# Patient Record
Sex: MALE | Race: BLACK OR AFRICAN AMERICAN | HISPANIC OR LATINO | Employment: OTHER | URBAN - METROPOLITAN AREA
[De-identification: names, ages, dates, MRNs, and addresses within clinical notes are randomized per-mention and may not be internally consistent; named-entity substitution may affect disease eponyms.]

---

## 2021-03-20 ENCOUNTER — OFFICE VISIT (OUTPATIENT)
Dept: URGENT CARE | Facility: CLINIC | Age: 56
End: 2021-03-20
Payer: MEDICAID

## 2021-03-20 VITALS
RESPIRATION RATE: 16 BRPM | HEIGHT: 73 IN | OXYGEN SATURATION: 98 % | SYSTOLIC BLOOD PRESSURE: 102 MMHG | BODY MASS INDEX: 23.59 KG/M2 | HEART RATE: 70 BPM | TEMPERATURE: 98.3 F | DIASTOLIC BLOOD PRESSURE: 64 MMHG | WEIGHT: 178 LBS

## 2021-03-20 DIAGNOSIS — M25.552 LEFT HIP PAIN: ICD-10-CM

## 2021-03-20 DIAGNOSIS — Z76.0 MEDICATION REFILL: ICD-10-CM

## 2021-03-20 DIAGNOSIS — F20.9 SCHIZOPHRENIA, UNSPECIFIED TYPE (HCC): ICD-10-CM

## 2021-03-20 PROCEDURE — 99204 OFFICE O/P NEW MOD 45 MIN: CPT | Mod: 25 | Performed by: NURSE PRACTITIONER

## 2021-03-20 RX ORDER — IBUPROFEN 800 MG/1
800 TABLET ORAL EVERY 8 HOURS PRN
Qty: 30 TABLET | Refills: 0 | Status: SHIPPED | OUTPATIENT
Start: 2021-03-20 | End: 2021-04-27 | Stop reason: SDUPTHER

## 2021-03-20 RX ORDER — LURASIDONE HYDROCHLORIDE 20 MG/1
20 TABLET, FILM COATED ORAL
Qty: 60 TABLET | Refills: 0 | Status: SHIPPED | OUTPATIENT
Start: 2021-03-20 | End: 2021-04-27 | Stop reason: SDUPTHER

## 2021-03-20 RX ORDER — KETOROLAC TROMETHAMINE 30 MG/ML
15 INJECTION, SOLUTION INTRAMUSCULAR; INTRAVENOUS ONCE
Status: COMPLETED | OUTPATIENT
Start: 2021-03-20 | End: 2021-03-20

## 2021-03-20 RX ADMIN — KETOROLAC TROMETHAMINE 15 MG: 30 INJECTION, SOLUTION INTRAMUSCULAR; INTRAVENOUS at 15:52

## 2021-03-20 ASSESSMENT — ENCOUNTER SYMPTOMS
DEPRESSION: 0
EYE PAIN: 0
NERVOUS/ANXIOUS: 0
DIZZINESS: 0
NAUSEA: 0
CHILLS: 0
SORE THROAT: 0
HALLUCINATIONS: 0
SHORTNESS OF BREATH: 0
FEVER: 0
VOMITING: 0
MYALGIAS: 0

## 2021-03-20 ASSESSMENT — LIFESTYLE VARIABLES: SUBSTANCE_ABUSE: 0

## 2021-03-20 NOTE — PROGRESS NOTES
Subjective:   David Petty is a 55 y.o. male who presents for Medication Refill (Latuda 20 mg. He needs a referral to PCP. )      HPI  Patient is a 55-year-old male who presents to the urgent care for request of a medication refill of Latuda that he takes daily for his schizophrenia.  Patient states that he has been taking this prescribed a dose of medication for the 3 or more years.  Patient states that this medication is very effective for his symptoms.  He has not had any thoughts of suicidal ideation or hallucinations while taking this medication.  Patient recently moved from [place] and has not yet established care with a new PCP and/or psychiatrist.  Patient has been out of his medication for the past 3 days.  Having no complications at this time.  Patient also notes chronic left hip pain which usually takes 800 mg ibuprofen however has been out of this medication as well.  Patient has been told that he may need a total hip replacement however has not decided to go through with it.  He denies any recent trauma or injuries.  He is also received Toradol injections in the past which has been effective for his pain.  Review of Systems   Constitutional: Negative for chills and fever.   HENT: Negative for sore throat.    Eyes: Negative for pain.   Respiratory: Negative for shortness of breath.    Cardiovascular: Negative for chest pain.   Gastrointestinal: Negative for nausea and vomiting.   Genitourinary: Negative for hematuria.   Musculoskeletal: Positive for joint pain. Negative for myalgias.   Skin: Negative for rash.   Neurological: Negative for dizziness.   Psychiatric/Behavioral: Negative for depression, hallucinations, substance abuse and suicidal ideas. The patient is not nervous/anxious.        Medications:    • ibuprofen Tabs  • ketorolac  • lurasidone Tabs    Allergies: Patient has no known allergies.    Problem List: David Petty does not have a problem list on file.    Surgical History:  No past  "surgical history on file.    Past Social Hx: David Petty  reports that he has been smoking cigarettes. He has been smoking about 5.00 packs per day. He has never used smokeless tobacco. He reports current alcohol use. He reports that he does not use drugs.     Past Family Hx:  David Petty family history is not on file.     Problem list, medications, and allergies reviewed by myself today in Epic.     Objective:     /64   Pulse 70   Temp 36.8 °C (98.3 °F) (Temporal)   Resp 16   Ht 1.854 m (6' 1\")   Wt 80.7 kg (178 lb)   SpO2 98%   BMI 23.48 kg/m²     Physical Exam  Vitals and nursing note reviewed.   Constitutional:       General: He is not in acute distress.     Appearance: He is well-developed.   HENT:      Head: Normocephalic and atraumatic.      Right Ear: External ear normal.      Left Ear: External ear normal.      Nose: Nose normal.      Mouth/Throat:      Mouth: Mucous membranes are moist.   Eyes:      Conjunctiva/sclera: Conjunctivae normal.   Cardiovascular:      Rate and Rhythm: Normal rate.   Pulmonary:      Effort: Pulmonary effort is normal. No respiratory distress.      Breath sounds: Normal breath sounds.   Abdominal:      General: There is no distension.   Musculoskeletal:         General: Normal range of motion.      Left hip: Tenderness present. No deformity or bony tenderness. Normal range of motion. Normal strength.   Skin:     General: Skin is warm and dry.   Neurological:      General: No focal deficit present.      Mental Status: He is alert and oriented to person, place, and time. Mental status is at baseline.      Gait: Gait (gait at baseline) normal.   Psychiatric:         Attention and Perception: Attention normal.         Mood and Affect: Mood normal.         Speech: Speech normal.         Behavior: Behavior normal.         Thought Content: Thought content normal.         Cognition and Memory: Cognition and memory normal.         Judgment: Judgment normal. "         Assessment/Plan:     Diagnosis and associated orders:     1. Medication refill  lurasidone (LATUDA) 20 MG Tab   2. Schizophrenia, unspecified type (HCC)  lurasidone (LATUDA) 20 MG Tab    REFERRAL TO BEHAVIORAL HEALTH    REFERRAL TO FAMILY PRACTICE   3. Left hip pain  ketorolac (TORADOL) injection 15 mg    ibuprofen (MOTRIN) 800 MG Tab      Comments/MDM:     • Patient is a pleasant 55-year-old male who present with the stated above overall physical exam fairly benign, vital signs stable, advised patient will refill medication for a month did advise patient that he will need to establish care with a primary care provider and a new psychiatrist for continued medication management.  Patient will be given a Toradol injection in clinic as this is ineffective for his left hip pain in the past.  • Differential diagnosis, natural history, supportive care, and indications for immediate follow-up discussed.          Please note that this dictation was created using voice recognition software. I have made a reasonable attempt to correct obvious errors, but I expect that there are errors of grammar and possibly content that I did not discover before finalizing the note.    This note was electronically signed by Timothy WEEKS.

## 2021-03-22 ENCOUNTER — TELEPHONE (OUTPATIENT)
Dept: SCHEDULING | Facility: IMAGING CENTER | Age: 56
End: 2021-03-22

## 2021-03-30 ENCOUNTER — IMMUNIZATION (OUTPATIENT)
Dept: FAMILY PLANNING/WOMEN'S HEALTH CLINIC | Facility: IMMUNIZATION CENTER | Age: 56
End: 2021-03-30
Payer: MEDICAID

## 2021-03-30 DIAGNOSIS — Z23 ENCOUNTER FOR VACCINATION: Primary | ICD-10-CM

## 2021-03-30 PROCEDURE — 0001A PFIZER SARS-COV-2 VACCINE: CPT

## 2021-03-30 PROCEDURE — 91300 PFIZER SARS-COV-2 VACCINE: CPT

## 2021-04-14 ENCOUNTER — PATIENT OUTREACH (OUTPATIENT)
Dept: SCHEDULING | Facility: IMAGING CENTER | Age: 56
End: 2021-04-14

## 2021-04-14 NOTE — PROGRESS NOTES
Attempt #  Outreach for COVID Vaccine Second Dose  Outreach Outcome:  4/14/21 Left v/m to schedule 2nd dose. MAURISIOB

## 2021-04-27 ENCOUNTER — OFFICE VISIT (OUTPATIENT)
Dept: MEDICAL GROUP | Facility: MEDICAL CENTER | Age: 56
End: 2021-04-27
Attending: PHYSICIAN ASSISTANT
Payer: MEDICAID

## 2021-04-27 VITALS
HEART RATE: 63 BPM | BODY MASS INDEX: 23.58 KG/M2 | OXYGEN SATURATION: 97 % | TEMPERATURE: 97.3 F | RESPIRATION RATE: 18 BRPM | DIASTOLIC BLOOD PRESSURE: 80 MMHG | HEIGHT: 73 IN | SYSTOLIC BLOOD PRESSURE: 100 MMHG | WEIGHT: 177.9 LBS

## 2021-04-27 DIAGNOSIS — G89.29 CHRONIC LEFT HIP PAIN: ICD-10-CM

## 2021-04-27 DIAGNOSIS — M16.12 OSTEOARTHRITIS OF LEFT HIP, UNSPECIFIED OSTEOARTHRITIS TYPE: ICD-10-CM

## 2021-04-27 DIAGNOSIS — M25.552 CHRONIC LEFT HIP PAIN: ICD-10-CM

## 2021-04-27 DIAGNOSIS — F20.9 SCHIZOPHRENIA, UNSPECIFIED TYPE (HCC): ICD-10-CM

## 2021-04-27 DIAGNOSIS — Z11.59 NEED FOR HEPATITIS C SCREENING TEST: ICD-10-CM

## 2021-04-27 DIAGNOSIS — Z00.00 HEALTH CARE MAINTENANCE: ICD-10-CM

## 2021-04-27 PROCEDURE — 99204 OFFICE O/P NEW MOD 45 MIN: CPT | Performed by: PHYSICIAN ASSISTANT

## 2021-04-27 PROCEDURE — 700111 HCHG RX REV CODE 636 W/ 250 OVERRIDE (IP): Performed by: PHYSICIAN ASSISTANT

## 2021-04-27 PROCEDURE — 99213 OFFICE O/P EST LOW 20 MIN: CPT | Performed by: PHYSICIAN ASSISTANT

## 2021-04-27 RX ORDER — KETOROLAC TROMETHAMINE 30 MG/ML
60 INJECTION, SOLUTION INTRAMUSCULAR; INTRAVENOUS ONCE
Status: DISCONTINUED | OUTPATIENT
Start: 2021-04-27 | End: 2021-04-27

## 2021-04-27 RX ORDER — IBUPROFEN 800 MG/1
800 TABLET ORAL EVERY 8 HOURS PRN
Qty: 30 TABLET | Refills: 0 | Status: SHIPPED | OUTPATIENT
Start: 2021-04-27 | End: 2021-06-02

## 2021-04-27 RX ORDER — CYCLOBENZAPRINE HCL 10 MG
10 TABLET ORAL 3 TIMES DAILY PRN
Qty: 90 TABLET | Refills: 5 | Status: SHIPPED | OUTPATIENT
Start: 2021-04-27 | End: 2022-05-05

## 2021-04-27 RX ORDER — KETOROLAC TROMETHAMINE 30 MG/ML
INJECTION, SOLUTION INTRAMUSCULAR; INTRAVENOUS
COMMUNITY
Start: 2021-03-20 | End: 2021-04-27

## 2021-04-27 RX ORDER — LURASIDONE HYDROCHLORIDE 20 MG/1
20 TABLET, FILM COATED ORAL
Qty: 60 TABLET | Refills: 0 | Status: SHIPPED | OUTPATIENT
Start: 2021-04-27 | End: 2021-07-08 | Stop reason: SDUPTHER

## 2021-04-27 RX ORDER — KETOROLAC TROMETHAMINE 30 MG/ML
30 INJECTION, SOLUTION INTRAMUSCULAR; INTRAVENOUS ONCE
Status: COMPLETED | OUTPATIENT
Start: 2021-04-27 | End: 2021-04-27

## 2021-04-27 RX ADMIN — KETOROLAC TROMETHAMINE 30 MG: 30 INJECTION, SOLUTION INTRAMUSCULAR at 10:58

## 2021-04-27 NOTE — PROGRESS NOTES
"Chief Complaint   Patient presents with   • Establish Care     meds refills     Subjective:     HPI:   David Petty is a 55 y.o. male here to establish care and to discuss the evaluation and management of:    Chronic left hip pain  Onset/KORI: 5 years ago after having a partial hip replacement. He reports complications post op and increased pain over time. He has not seen an orthopedic specialist for several years.   Location: left anterior hip.   Duration: Constant.   Character: \"Feels like a knife that is stuck in the hip.\"  Associated symptoms: Discoloration of the skin over the hip.   Alleviating factors: Toradol.   Aggravating factors: Movement.   Radiation: None.   Treatments tried: Ibuprofen. Prior PCP was giving him a Toradol shot once monthly which has been very beneficial for him.   Red flags: Rest pain.   No fevers, chills, erythema or warm to the site.     Schizophrenia (HCC)  David presents today to establish care. He reports a history of schizophrenia. He has been on Latuda 20 mg once daily and has tolerated the medication well without any known side effects. He states that since being on Latuda his depression and schizophrenia have been well controlled. He also states that all suicidal thoughts have diminished and he has been much happier. He used to be established with a psychiatrist here in town, but reports that the psychiatrist is no longer working in the [place] area.  He is requesting a medication refill and a new referral for psychiatry.     ROS  See HPI.     No Known Allergies    Current medicines (including changes today)  Current Outpatient Medications   Medication Sig Dispense Refill   • cyclobenzaprine (FLEXERIL) 10 mg Tab Take 1 tablet by mouth 3 times a day as needed for Muscle Spasms. 90 tablet 5   • ibuprofen (MOTRIN) 800 MG Tab Take 1 tablet by mouth every 8 hours as needed for Moderate Pain or Inflammation. 30 tablet 0   • lurasidone (LATUDA) 20 MG Tab Take 1 tablet by mouth with dinner. 60 " "tablet 0     Current Facility-Administered Medications   Medication Dose Route Frequency Provider Last Rate Last Admin   • ketorolac (TORADOL) injection 30 mg  30 mg Intramuscular Once Tawana Kiran P.A.-C.         He  has a past medical history of Anxiety, Arthralgia of left hip, Depression, and Schizophrenia (HCC).  He  has a past surgical history that includes appendectomy and arthroplasty.  Social History     Tobacco Use   • Smoking status: Current Every Day Smoker     Packs/day: 1.00     Types: Cigarettes   • Smokeless tobacco: Never Used   Substance Use Topics   • Alcohol use: Yes     Comment: Occasionally   • Drug use: Never       Family History   Problem Relation Age of Onset   • Cancer Neg Hx    • Lung Disease Neg Hx    • Heart Disease Neg Hx    • Diabetes Neg Hx    • Hypertension Neg Hx    • Hyperlipidemia Neg Hx      No family status information on file.       Patient Active Problem List    Diagnosis Date Noted   • Chronic left hip pain 04/27/2021   • Schizophrenia (HCC) 04/27/2021        Objective:     /80 (BP Location: Left arm, Patient Position: Sitting, BP Cuff Size: Adult long)   Pulse 63   Temp 36.3 °C (97.3 °F) (Temporal)   Resp 18   Ht 1.854 m (6' 1\")   Wt 80.7 kg (177 lb 14.4 oz)   SpO2 97%  Body mass index is 23.47 kg/m².    Physical Exam:  Physical Exam   Constitutional: He is oriented to person, place, and time and well-developed, well-nourished, and in no distress.   HENT:   Head: Normocephalic.   Right Ear: External ear normal.   Left Ear: External ear normal.   Eyes: Pupils are equal, round, and reactive to light.   Neck: No thyromegaly present.   Cardiovascular: Normal rate, regular rhythm and normal heart sounds.   Pulmonary/Chest: Effort normal and breath sounds normal.   Musculoskeletal:      Cervical back: Normal range of motion.      Left hip: Tenderness present. Decreased range of motion. Decreased strength.   Lymphadenopathy:     He has no cervical adenopathy.        " Right: No supraclavicular adenopathy present.        Left: No supraclavicular adenopathy present.   Neurological: He is alert and oriented to person, place, and time. Gait normal.   Skin: Skin is warm and dry.   Psychiatric: Affect and judgment normal.   Vitals reviewed.     Assessment and Plan:     The following treatment plan was discussed:    1. Chronic left hip pain, Osteoarthritis of left hip, unspecified osteoarthritis type  - This is chronic worsening condition.  - Patient is experiencing pain at rest, has decreased ROM and strength.   - Plan: REFERRAL TO ORTHOPEDICS. In the meantime, refills of ibuprofen and flexeril were sent to the pharmacy. We will provide him with a toradol injection today to help with pain.   - cyclobenzaprine (FLEXERIL) 10 mg Tab; Take 1 tablet by mouth 3 times a day as needed for Muscle Spasms.  Dispense: 90 tablet; Refill: 5  - ibuprofen (MOTRIN) 800 MG Tab; Take 1 tablet by mouth every 8 hours as needed for Moderate Pain or Inflammation.  Dispense: 30 tablet; Refill: 0  - ketorolac (TORADOL) injection 30 mg    2. Schizophrenia, unspecified type (HCC)  - This is a chronic well controlled condition.   - No imminent risk of suicidal or homicidal ideation.   - Plan:  REFERRAL TO PSYCHIATRY for medication management. I have agreed to bridge his Latuda prescription until he can get reestablished with psychiatry.  - lurasidone (LATUDA) 20 MG Tab; Take 1 tablet by mouth with dinner.  Dispense: 60 tablet; Refill: 0    3. Health care maintenance  - David is overdue for health maintenance labs.   - CBC WITH DIFFERENTIAL; Future  - Comp Metabolic Panel; Future  - HEMOGLOBIN A1C; Future  - Lipid Profile; Future  - TSH WITH REFLEX TO FT4; Future  - VITAMIN D,25 HYDROXY; Future  - H. PYLORI, UREA BREATH TEST, ADULT; Future  - T-TRANSGLUTAMINASE (TTG) IGA; Future    4. Need for hepatitis C screening test  - HEP C VIRUS ANTIBODY; Future      Any change or worsening of signs or symptoms, patient  encouraged to follow-up or report to emergency room for further evaluation. Patient verbalizes understanding and agrees.    Follow-Up: Return in about 4 weeks (around 5/25/2021) for F/u labs, Med check.      PLEASE NOTE: This dictation was created using voice recognition software. I have made every reasonable attempt to correct obvious errors, but I expect that there are errors of grammar and possibly content that I did not discover before finalizing the note.

## 2021-04-27 NOTE — ASSESSMENT & PLAN NOTE
"Onset/KORI: 5 years ago after having a partial hip replacement. He reports complications post op and increased pain over time. He has not seen an orthopedic specialist for several years.   Location: left anterior hip.   Duration: Constant.   Character: \"Feels like a knife that is stuck in the hip.\"  Associated symptoms: Discoloration of the skin over the hip.   Alleviating factors: Toradol.   Aggravating factors: Movement.   Radiation: None.   Treatments tried: Ibuprofen. Prior PCP was giving him a Toradol shot once monthly which has been very beneficial for him.   Red flags: Rest pain.   No fevers, chills, erythema or warm to the site.   "

## 2021-04-27 NOTE — ASSESSMENT & PLAN NOTE
David presents today to establish care. He reports a history of schizophrenia. He has been on Latuda 20 mg once daily and has tolerated the medication well without any known side effects. He states that since being on Latuda his depression and schizophrenia have been well controlled. He also states that all suicidal thoughts have diminished and he has been much happier. He used to be established with a psychiatrist here in [facility], but reports that the psychiatrist is no longer working in the [place] area.  He is requesting a medication refill and a new referral for psychiatry.

## 2021-04-30 ENCOUNTER — IMMUNIZATION (OUTPATIENT)
Dept: FAMILY PLANNING/WOMEN'S HEALTH CLINIC | Facility: IMMUNIZATION CENTER | Age: 56
End: 2021-04-30
Attending: INTERNAL MEDICINE
Payer: MEDICAID

## 2021-04-30 DIAGNOSIS — Z23 ENCOUNTER FOR VACCINATION: Primary | ICD-10-CM

## 2021-04-30 PROCEDURE — 91300 PFIZER SARS-COV-2 VACCINE: CPT | Performed by: INTERNAL MEDICINE

## 2021-04-30 PROCEDURE — 0002A PFIZER SARS-COV-2 VACCINE: CPT | Performed by: INTERNAL MEDICINE

## 2021-05-10 ENCOUNTER — HOSPITAL ENCOUNTER (OUTPATIENT)
Dept: LAB | Facility: MEDICAL CENTER | Age: 56
End: 2021-05-10
Attending: PHYSICIAN ASSISTANT
Payer: MEDICAID

## 2021-05-10 DIAGNOSIS — Z00.00 HEALTH CARE MAINTENANCE: ICD-10-CM

## 2021-05-10 DIAGNOSIS — Z11.59 NEED FOR HEPATITIS C SCREENING TEST: ICD-10-CM

## 2021-05-10 LAB
ALBUMIN SERPL BCP-MCNC: 4 G/DL (ref 3.2–4.9)
ALBUMIN/GLOB SERPL: 1.2 G/DL
ALP SERPL-CCNC: 50 U/L (ref 30–99)
ALT SERPL-CCNC: 17 U/L (ref 2–50)
ANION GAP SERPL CALC-SCNC: 9 MMOL/L (ref 7–16)
AST SERPL-CCNC: 17 U/L (ref 12–45)
BASOPHILS # BLD AUTO: 0.7 % (ref 0–1.8)
BASOPHILS # BLD: 0.06 K/UL (ref 0–0.12)
BILIRUB SERPL-MCNC: 0.5 MG/DL (ref 0.1–1.5)
BUN SERPL-MCNC: 13 MG/DL (ref 8–22)
CALCIUM SERPL-MCNC: 9.7 MG/DL (ref 8.5–10.5)
CHLORIDE SERPL-SCNC: 104 MMOL/L (ref 96–112)
CHOLEST SERPL-MCNC: 232 MG/DL (ref 100–199)
CO2 SERPL-SCNC: 25 MMOL/L (ref 20–33)
CREAT SERPL-MCNC: 1.05 MG/DL (ref 0.5–1.4)
EOSINOPHIL # BLD AUTO: 0.4 K/UL (ref 0–0.51)
EOSINOPHIL NFR BLD: 4.6 % (ref 0–6.9)
ERYTHROCYTE [DISTWIDTH] IN BLOOD BY AUTOMATED COUNT: 46.4 FL (ref 35.9–50)
EST. AVERAGE GLUCOSE BLD GHB EST-MCNC: 108 MG/DL
FASTING STATUS PATIENT QL REPORTED: NORMAL
GLOBULIN SER CALC-MCNC: 3.3 G/DL (ref 1.9–3.5)
GLUCOSE SERPL-MCNC: 81 MG/DL (ref 65–99)
HBA1C MFR BLD: 5.4 % (ref 4–5.6)
HCT VFR BLD AUTO: 54.7 % (ref 42–52)
HCV AB SER QL: NORMAL
HDLC SERPL-MCNC: 39 MG/DL
HGB BLD-MCNC: 18.5 G/DL (ref 14–18)
IMM GRANULOCYTES # BLD AUTO: 0.03 K/UL (ref 0–0.11)
IMM GRANULOCYTES NFR BLD AUTO: 0.3 % (ref 0–0.9)
LDLC SERPL CALC-MCNC: 164 MG/DL
LYMPHOCYTES # BLD AUTO: 2.88 K/UL (ref 1–4.8)
LYMPHOCYTES NFR BLD: 33.4 % (ref 22–41)
MCH RBC QN AUTO: 33 PG (ref 27–33)
MCHC RBC AUTO-ENTMCNC: 33.8 G/DL (ref 33.7–35.3)
MCV RBC AUTO: 97.7 FL (ref 81.4–97.8)
MONOCYTES # BLD AUTO: 0.71 K/UL (ref 0–0.85)
MONOCYTES NFR BLD AUTO: 8.2 % (ref 0–13.4)
NEUTROPHILS # BLD AUTO: 4.54 K/UL (ref 1.82–7.42)
NEUTROPHILS NFR BLD: 52.8 % (ref 44–72)
NRBC # BLD AUTO: 0 K/UL
NRBC BLD-RTO: 0 /100 WBC
PLATELET # BLD AUTO: 210 K/UL (ref 164–446)
PMV BLD AUTO: 11 FL (ref 9–12.9)
POTASSIUM SERPL-SCNC: 4.7 MMOL/L (ref 3.6–5.5)
PROT SERPL-MCNC: 7.3 G/DL (ref 6–8.2)
RBC # BLD AUTO: 5.6 M/UL (ref 4.7–6.1)
SODIUM SERPL-SCNC: 138 MMOL/L (ref 135–145)
TRIGL SERPL-MCNC: 145 MG/DL (ref 0–149)
TSH SERPL DL<=0.005 MIU/L-ACNC: 1.08 UIU/ML (ref 0.38–5.33)
WBC # BLD AUTO: 8.6 K/UL (ref 4.8–10.8)

## 2021-05-10 PROCEDURE — 83013 H PYLORI (C-13) BREATH: CPT

## 2021-05-10 PROCEDURE — 83036 HEMOGLOBIN GLYCOSYLATED A1C: CPT

## 2021-05-10 PROCEDURE — 36415 COLL VENOUS BLD VENIPUNCTURE: CPT

## 2021-05-10 PROCEDURE — 85025 COMPLETE CBC W/AUTO DIFF WBC: CPT

## 2021-05-10 PROCEDURE — 83516 IMMUNOASSAY NONANTIBODY: CPT

## 2021-05-10 PROCEDURE — 84443 ASSAY THYROID STIM HORMONE: CPT

## 2021-05-10 PROCEDURE — 80061 LIPID PANEL: CPT

## 2021-05-10 PROCEDURE — 86803 HEPATITIS C AB TEST: CPT

## 2021-05-10 PROCEDURE — 80053 COMPREHEN METABOLIC PANEL: CPT

## 2021-05-10 PROCEDURE — 82306 VITAMIN D 25 HYDROXY: CPT

## 2021-05-11 LAB — UREA BREATH TEST QL: NEGATIVE

## 2021-05-12 LAB
25(OH)D3 SERPL-MCNC: 15 NG/ML (ref 30–80)
TTG IGA SER IA-ACNC: <2 U/ML (ref 0–3)

## 2021-06-02 ENCOUNTER — OFFICE VISIT (OUTPATIENT)
Dept: MEDICAL GROUP | Facility: MEDICAL CENTER | Age: 56
End: 2021-06-02
Attending: PHYSICIAN ASSISTANT
Payer: MEDICAID

## 2021-06-02 VITALS
TEMPERATURE: 97.7 F | OXYGEN SATURATION: 66 % | RESPIRATION RATE: 18 BRPM | SYSTOLIC BLOOD PRESSURE: 90 MMHG | HEIGHT: 73 IN | BODY MASS INDEX: 22.97 KG/M2 | WEIGHT: 173.3 LBS | DIASTOLIC BLOOD PRESSURE: 70 MMHG | HEART RATE: 100 BPM

## 2021-06-02 DIAGNOSIS — E78.5 DYSLIPIDEMIA: ICD-10-CM

## 2021-06-02 DIAGNOSIS — G89.29 CHRONIC LEFT HIP PAIN: ICD-10-CM

## 2021-06-02 DIAGNOSIS — Z71.6 ENCOUNTER FOR SMOKING CESSATION COUNSELING: ICD-10-CM

## 2021-06-02 DIAGNOSIS — E55.9 VITAMIN D DEFICIENCY: ICD-10-CM

## 2021-06-02 DIAGNOSIS — M25.552 CHRONIC LEFT HIP PAIN: ICD-10-CM

## 2021-06-02 PROCEDURE — 99213 OFFICE O/P EST LOW 20 MIN: CPT | Performed by: PHYSICIAN ASSISTANT

## 2021-06-02 PROCEDURE — 700111 HCHG RX REV CODE 636 W/ 250 OVERRIDE (IP): Performed by: PHYSICIAN ASSISTANT

## 2021-06-02 PROCEDURE — 99214 OFFICE O/P EST MOD 30 MIN: CPT | Performed by: PHYSICIAN ASSISTANT

## 2021-06-02 RX ORDER — KETOROLAC TROMETHAMINE 30 MG/ML
60 INJECTION, SOLUTION INTRAMUSCULAR; INTRAVENOUS ONCE
Status: COMPLETED | OUTPATIENT
Start: 2021-06-02 | End: 2021-06-02

## 2021-06-02 RX ORDER — CHOLECALCIFEROL (VITAMIN D3) 25 MCG
25 CAPSULE ORAL DAILY
Qty: 60 CAPSULE | Refills: 5 | Status: SHIPPED | OUTPATIENT
Start: 2021-06-02 | End: 2023-11-09 | Stop reason: SDUPTHER

## 2021-06-02 RX ORDER — KETOROLAC TROMETHAMINE 30 MG/ML
30 INJECTION, SOLUTION INTRAMUSCULAR; INTRAVENOUS ONCE
Status: DISCONTINUED | OUTPATIENT
Start: 2021-06-02 | End: 2021-06-02

## 2021-06-02 RX ORDER — VARENICLINE TARTRATE 0.5 MG/1
0.5 TABLET, FILM COATED ORAL DAILY
Qty: 11 TABLET | Refills: 0 | Status: SHIPPED | OUTPATIENT
Start: 2021-06-02 | End: 2021-06-17

## 2021-06-02 RX ORDER — ATORVASTATIN CALCIUM 10 MG/1
10 TABLET, FILM COATED ORAL DAILY
Qty: 30 TABLET | Refills: 5 | Status: SHIPPED | OUTPATIENT
Start: 2021-06-02 | End: 2022-10-12

## 2021-06-02 RX ADMIN — KETOROLAC TROMETHAMINE 60 MG: 30 INJECTION, SOLUTION INTRAMUSCULAR at 15:00

## 2021-06-02 ASSESSMENT — FIBROSIS 4 INDEX: FIB4 SCORE: 1.1

## 2021-06-02 NOTE — PATIENT INSTRUCTIONS
Chantix/varenicline:   Initial:  Days 1 to 3: 0.5 mg once daily  Days 4 to 7: 0.5 mg twice daily  Maintenance (? Day 8): 1 mg twice daily for 11 weeks; may consider a temporary or permanent dose reduction if usual dose is not tolerated.    Note: Start 1 week before target quit date: 6/9/21.  If able to successfully quit smoking at the end of the 12 weeks, may continue for another 12 weeks to help maintain success.

## 2021-06-02 NOTE — PROGRESS NOTES
Chief Complaint   Patient presents with   • Other     discuss labs       Subjective:     HPI:   David Petty is a 56 y.o. male here to discuss the evaluation and management of:    Encounter for smoking cessation counseling  This is a chronic condition. He is smoking tobacco.  Years used: 40 years.  Packs/day: 2-3 packs per day.   Previously quit/prior attempts: Over a half a dozen times.   Duration of success: 30 days -- because he was admitted into the hospital.    Method used: Patches and gum.   Triggers/reason for relapse: Habit and stress.   Denies dyspnea, coughing or wheezing.   Set quit date: 6/16/21.   Quitting partner or good support system: Yes.     Dyslipidemia  This is a new condition.   Clinical ASCVD: None.   10 yr ASCVD risk score: 8.2%  Cardiovascular risk factors: HLD (HDL <40 or LDL >130), Smoking.    Last lipid panel:   Lab Results   Component Value Date/Time    CHOLSTRLTOT 232 (H) 05/10/2021 10:30 AM     (H) 05/10/2021 10:30 AM    HDL 39 (A) 05/10/2021 10:30 AM    TRIGLYCERIDE 145 05/10/2021 10:30 AM       Chronic left hip pain  David presents today for follow-up.  He reports that he continues to have chronic left hip pain.  He has a follow-up appointment scheduled for review of x-rays on 6/9/2021.  He states that his insurance just approved for him to continue in physical therapy.    ROS  See HPI.     No Known Allergies    Current medicines (including changes today)  Current Outpatient Medications   Medication Sig Dispense Refill   • Cholecalciferol (VITAMIN D-3) 25 MCG (1000 UT) Cap Take 25 mcg by mouth every day. 60 capsule 5   • atorvastatin (LIPITOR) 10 MG Tab Take 1 tablet by mouth every day. 30 tablet 5   • varenicline (CHANTIX) 0.5 MG tablet Take 1 tablet by mouth every day. 11 tablet 0   • cyclobenzaprine (FLEXERIL) 10 mg Tab Take 1 tablet by mouth 3 times a day as needed for Muscle Spasms. 90 tablet 5   • lurasidone (LATUDA) 20 MG Tab Take 1 tablet by mouth with dinner. 60 tablet  "0     No current facility-administered medications for this visit.       Social History     Tobacco Use   • Smoking status: Current Every Day Smoker     Packs/day: 1.00     Types: Cigarettes   • Smokeless tobacco: Never Used   Vaping Use   • Vaping Use: Never used   Substance Use Topics   • Alcohol use: Yes     Comment: Occasionally   • Drug use: Never       Patient Active Problem List    Diagnosis Date Noted   • Encounter for smoking cessation counseling 06/02/2021   • Dyslipidemia 06/02/2021   • Chronic left hip pain 04/27/2021   • Schizophrenia (HCC) 04/27/2021       Family History   Problem Relation Age of Onset   • Cancer Neg Hx    • Lung Disease Neg Hx    • Heart Disease Neg Hx    • Diabetes Neg Hx    • Hypertension Neg Hx    • Hyperlipidemia Neg Hx         Objective:     BP (!) 90/70 (BP Location: Left arm, Patient Position: Sitting, BP Cuff Size: Adult)   Pulse 100   Temp 36.5 °C (97.7 °F) (Temporal)   Resp 18   Ht 1.854 m (6' 1\")   Wt 78.6 kg (173 lb 4.8 oz)   SpO2 (!) 66%  Body mass index is 22.86 kg/m².    Physical Exam:  Physical Exam  Vitals reviewed.   Constitutional:       Appearance: Normal appearance.   HENT:      Head: Normocephalic.   Cardiovascular:      Rate and Rhythm: Normal rate and regular rhythm.      Heart sounds: Normal heart sounds.   Pulmonary:      Effort: Pulmonary effort is normal.      Breath sounds: Normal breath sounds.   Musculoskeletal:      Left hip: Tenderness and bony tenderness present. Decreased range of motion. Decreased strength.   Skin:     General: Skin is warm and dry.   Neurological:      General: No focal deficit present.      Mental Status: He is alert and oriented to person, place, and time.   Psychiatric:         Mood and Affect: Mood normal.         Behavior: Behavior normal.       Assessment and Plan:     The following treatment plan was discussed:    1. Encounter for smoking cessation counseling  - This is a chronic condition in which the patient is " seeking treatment to quit smoking.  - Spent the majority of the time during the visit counseling the patient on smoking cessation.  - Varenicline (CHANTIX) 0.5 MG tablet; Take 1 Tab by mouth 2 Times a day, Dispense: 11 Tab; Refill: 0              - Days 1 to 3: 0.5 mg once daily              - Days 4 to 7: 0.5 mg twice daily  - Maintenance (? Day 8): 1 mg twice daily for 11 weeks; may consider a temporary or permanent dose reduction if usual dose is not tolerated.  - Education given on medication use/regimen and side effect profile.  - Follow up in 3 weeks for med check.     2. Dyslipidemia  - This is a new condition.  - Plan: Start on 10 mg of Atorvastatin nightly. Discussed the important of lifestyle changes that include a well balanced diet, exercise and smoking cessation.  - atorvastatin (LIPITOR) 10 MG Tab; Take 1 tablet by mouth every day.  Dispense: 30 tablet; Refill: 5    3. Chronic left hip pain  - This is a chronic condition.  - Plan: Continue follow up with ortho as scheduled. ROR signed in clinic today to obtain records.  - ketorolac (TORADOL) injection of 30 mg was given to the patient today in clinic.     4. Vitamin D deficiency  - Cholecalciferol (VITAMIN D-3) 25 MCG (1000 UT) Cap; Take 25 mcg by mouth every day.  Dispense: 60 capsule; Refill: 5     Any change or worsening of signs or symptoms, patient encouraged to follow-up or report to emergency room for further evaluation. Patient verbalizes understanding and agrees.    Follow-Up: Return in about 4 weeks (around 6/30/2021) for Med check.      PLEASE NOTE: This dictation was created using voice recognition software. I have made every reasonable attempt to correct obvious errors, but I expect that there are errors of grammar and possibly content that I did not discover before finalizing the note.

## 2021-06-02 NOTE — ASSESSMENT & PLAN NOTE
David presents today for follow-up.  He reports that he continues to have chronic left hip pain.  He has a follow-up appointment scheduled for review of x-rays on 6/9/2021.  He states that his insurance just approved for him to continue in physical therapy.

## 2021-06-02 NOTE — ASSESSMENT & PLAN NOTE
This is a new condition.   Clinical ASCVD: None.   10 yr ASCVD risk score: 8.2%  Cardiovascular risk factors: HLD (HDL <40 or LDL >130), Smoking.    Last lipid panel:   Lab Results   Component Value Date/Time    CHOLSTRLTOT 232 (H) 05/10/2021 10:30 AM     (H) 05/10/2021 10:30 AM    HDL 39 (A) 05/10/2021 10:30 AM    TRIGLYCERIDE 145 05/10/2021 10:30 AM

## 2021-06-02 NOTE — ASSESSMENT & PLAN NOTE
This is a chronic condition. He is smoking tobacco.  Years used: 40 years.  Packs/day: 2-3 packs per day.   Previously quit/prior attempts: Over a half a dozen times.   Duration of success: 30 days -- because he was admitted into the hospital.    Method used: Patches and gum.   Triggers/reason for relapse: Habit and stress.   Denies dyspnea, coughing or wheezing.   Set quit date: 6/16/21.   Quitting partner or good support system: Yes.

## 2021-06-17 DIAGNOSIS — Z71.6 ENCOUNTER FOR SMOKING CESSATION COUNSELING: ICD-10-CM

## 2021-06-17 RX ORDER — VARENICLINE TARTRATE 1 MG/1
1 TABLET, FILM COATED ORAL 2 TIMES DAILY
Qty: 60 TABLET | Refills: 3 | Status: SHIPPED | OUTPATIENT
Start: 2021-06-17 | End: 2021-07-08 | Stop reason: SDUPTHER

## 2021-07-08 ENCOUNTER — OFFICE VISIT (OUTPATIENT)
Dept: MEDICAL GROUP | Facility: MEDICAL CENTER | Age: 56
End: 2021-07-08
Attending: PHYSICIAN ASSISTANT
Payer: MEDICAID

## 2021-07-08 VITALS
HEART RATE: 68 BPM | WEIGHT: 176.1 LBS | OXYGEN SATURATION: 95 % | BODY MASS INDEX: 23.34 KG/M2 | TEMPERATURE: 98.1 F | SYSTOLIC BLOOD PRESSURE: 132 MMHG | HEIGHT: 73 IN | RESPIRATION RATE: 18 BRPM | DIASTOLIC BLOOD PRESSURE: 90 MMHG

## 2021-07-08 DIAGNOSIS — F20.9 SCHIZOPHRENIA, UNSPECIFIED TYPE (HCC): ICD-10-CM

## 2021-07-08 DIAGNOSIS — M25.552 CHRONIC LEFT HIP PAIN: ICD-10-CM

## 2021-07-08 DIAGNOSIS — Z71.6 ENCOUNTER FOR SMOKING CESSATION COUNSELING: ICD-10-CM

## 2021-07-08 DIAGNOSIS — G89.29 CHRONIC LEFT HIP PAIN: ICD-10-CM

## 2021-07-08 PROCEDURE — 99213 OFFICE O/P EST LOW 20 MIN: CPT | Performed by: PHYSICIAN ASSISTANT

## 2021-07-08 PROCEDURE — 700111 HCHG RX REV CODE 636 W/ 250 OVERRIDE (IP): Performed by: PHYSICIAN ASSISTANT

## 2021-07-08 PROCEDURE — 99214 OFFICE O/P EST MOD 30 MIN: CPT | Performed by: PHYSICIAN ASSISTANT

## 2021-07-08 RX ORDER — KETOROLAC TROMETHAMINE 30 MG/ML
60 INJECTION, SOLUTION INTRAMUSCULAR; INTRAVENOUS ONCE
Status: COMPLETED | OUTPATIENT
Start: 2021-07-08 | End: 2021-07-08

## 2021-07-08 RX ORDER — VARENICLINE TARTRATE 1 MG/1
1 TABLET, FILM COATED ORAL 2 TIMES DAILY
Qty: 60 TABLET | Refills: 3 | Status: SHIPPED | OUTPATIENT
Start: 2021-07-08 | End: 2022-10-12

## 2021-07-08 RX ORDER — LURASIDONE HYDROCHLORIDE 20 MG/1
20 TABLET, FILM COATED ORAL
Qty: 60 TABLET | Refills: 3 | Status: SHIPPED | OUTPATIENT
Start: 2021-07-08 | End: 2022-01-06 | Stop reason: SDUPTHER

## 2021-07-08 RX ADMIN — KETOROLAC TROMETHAMINE 60 MG: 30 INJECTION, SOLUTION INTRAMUSCULAR at 12:15

## 2021-07-08 ASSESSMENT — FIBROSIS 4 INDEX: FIB4 SCORE: 1.1

## 2021-07-08 NOTE — PROGRESS NOTES
Chief Complaint   Patient presents with   • Other     Medication refill       Subjective:     HPI:   David Petty is a 56 y.o. male here to discuss the evaluation and management of:    Chronic left hip pain  David presents today for a Toradol injection. He reports great pain relief with this.     Encounter for smoking cessation counseling  David presents today for follow up regarding smoking cessation. He states that he has been taking the Chantix as prescribed and reports that he has been tolerating it well without any known side effects. He has cut back tremendously on the amount he is smoking. He is smoking about 3/4 less than what he has typically smoked in the past. He is overall doing well and is requesting a refill of this medication.     ROS  See HPI.     No Known Allergies    Current medicines (including changes today)  Current Outpatient Medications   Medication Sig Dispense Refill   • varenicline (CHANTIX) 1 MG tablet Take 1 tablet by mouth 2 times a day. 60 tablet 3   • lurasidone (LATUDA) 20 MG Tab Take 1 tablet by mouth with dinner. 60 tablet 3   • Cholecalciferol (VITAMIN D-3) 25 MCG (1000 UT) Cap Take 25 mcg by mouth every day. 60 capsule 5   • atorvastatin (LIPITOR) 10 MG Tab Take 1 tablet by mouth every day. 30 tablet 5   • cyclobenzaprine (FLEXERIL) 10 mg Tab Take 1 tablet by mouth 3 times a day as needed for Muscle Spasms. 90 tablet 5     No current facility-administered medications for this visit.       Social History     Tobacco Use   • Smoking status: Current Every Day Smoker     Packs/day: 1.00     Types: Cigarettes   • Smokeless tobacco: Never Used   Vaping Use   • Vaping Use: Never used   Substance Use Topics   • Alcohol use: Yes     Comment: Occasionally   • Drug use: Never       Patient Active Problem List    Diagnosis Date Noted   • Encounter for smoking cessation counseling 06/02/2021   • Dyslipidemia 06/02/2021   • Chronic left hip pain 04/27/2021   • Schizophrenia (HCC) 04/27/2021  "      Family History   Problem Relation Age of Onset   • Cancer Neg Hx    • Lung Disease Neg Hx    • Heart Disease Neg Hx    • Diabetes Neg Hx    • Hypertension Neg Hx    • Hyperlipidemia Neg Hx         Objective:     /90 (BP Location: Left arm, Patient Position: Sitting, BP Cuff Size: Adult)   Pulse 68   Temp 36.7 °C (98.1 °F) (Temporal)   Resp 18   Ht 1.854 m (6' 1\")   Wt 79.9 kg (176 lb 1.6 oz)   SpO2 95%  Body mass index is 23.23 kg/m².    Physical Exam:  Physical Exam  Vitals reviewed.   Constitutional:       Appearance: Normal appearance.   HENT:      Head: Normocephalic and atraumatic.   Cardiovascular:      Rate and Rhythm: Regular rhythm.      Pulses: Normal pulses.      Heart sounds: Normal heart sounds.   Pulmonary:      Effort: Pulmonary effort is normal.      Breath sounds: Normal breath sounds.   Musculoskeletal:      Cervical back: Normal range of motion.      Left hip: Bony tenderness present. Decreased range of motion. Decreased strength.   Neurological:      General: No focal deficit present.      Mental Status: He is alert and oriented to person, place, and time.   Psychiatric:         Mood and Affect: Mood normal.         Behavior: Behavior normal.       Assessment and Plan:     The following treatment plan was discussed:    1. Chronic left hip pain  - This is a chronic condition.   - Plan: Administered Toradol injection. Patient tolerated this well.   - ketorolac (TORADOL) injection 60 mg    2. Encounter for smoking cessation counseling  - This is an improving condition.  - Plan: Continue on Chantix. Prescription has been sent to the pharmacy.   - varenicline (CHANTIX) 1 MG tablet; Take 1 tablet by mouth 2 times a day.  Dispense: 60 tablet; Refill: 3    3. Schizophrenia, unspecified type (HCC)  - lurasidone (LATUDA) 20 MG Tab; Take 1 tablet by mouth with dinner.  Dispense: 60 tablet; Refill: 3     Any change or worsening of signs or symptoms, patient encouraged to follow-up or report " to emergency room for further evaluation. Patient verbalizes understanding and agrees.    Follow-Up: Return if symptoms worsen or fail to improve.      PLEASE NOTE: This dictation was created using voice recognition software. I have made every reasonable attempt to correct obvious errors, but I expect that there are errors of grammar and possibly content that I did not discover before finalizing the note.

## 2021-07-08 NOTE — ASSESSMENT & PLAN NOTE
David presents today for follow up regarding smoking cessation. He states that he has been taking the Chantix as prescribed and reports that he has been tolerating it well without any known side effects. He has cut back tremendously on the amount he is smoking. He is smoking about 3/4 less than what he has typically smoked in the past. He is overall doing well and is requesting a refill of this medication.

## 2021-11-23 NOTE — LETTER
[employer]  Tawana Kiran P.A.-C.  [address]  [place] [address]  Fax: [phone]   Authorization for Release/Disclosure of   Protected Health Information   Name: DAVID RUSSELL : 1965 SSN: [id number]   Address: [address]  [place] [address] Phone:    [phone] (home)    I authorize the entity listed below to release/disclose the PHI below to:   [facility]/Tawana Kiran P.A.-C. and Tawana Kiran P.A.-C.   Provider or Entity Name:    [facility]       [facility], [place]    [address]  [place] [address] Phone: [phone]      Fax:     Reason for request: continuity of care   Information to be released:    [  ] LAST COLONOSCOPY,  including any PATH REPORT and follow-up  [  ] LAST FIT/COLOGUARD RESULT [  ] LAST DEXA  [  ] LAST MAMMOGRAM  [  ] LAST PAP  [  ] LAST LABS [  ] RETINA EXAM REPORT  [  ] IMMUNIZATION RECORDS  [ XXX ] Release all info      [  ] Check here and initial the line next to each item to release ALL health information INCLUDING  _____ Care and treatment for drug and / or alcohol abuse  _____ HIV testing, infection status, or AIDS  _____ Genetic Testing    DATES OF SERVICE OR TIME PERIOD TO BE DISCLOSED: _____________  I understand and acknowledge that:  * This Authorization may be revoked at any time by you in writing, except if your health information has already been used or disclosed.  * Your health information that will be used or disclosed as a result of you signing this authorization could be re-disclosed by the recipient. If this occurs, your re-disclosed health information may no longer be protected by State or Federal laws.  * You may refuse to sign this Authorization. Your refusal will not affect your ability to obtain treatment.  * This Authorization becomes effective upon signing and will  on (date) __________.      If no date is indicated, this Authorization will  one (1) year from the signature date.    Name: David KULKARNI  Jovanny    Signature:   Date:     6/2/2021       PLEASE FAX REQUESTED RECORDS BACK TO: [phone]   Myalgia Monitoring: I explained this is common when taking isotretinoin. If this worsens they will contact us.

## 2022-01-06 ENCOUNTER — OFFICE VISIT (OUTPATIENT)
Dept: MEDICAL GROUP | Facility: MEDICAL CENTER | Age: 57
End: 2022-01-06
Attending: PHYSICIAN ASSISTANT
Payer: MEDICAID

## 2022-01-06 VITALS
BODY MASS INDEX: 23.59 KG/M2 | SYSTOLIC BLOOD PRESSURE: 106 MMHG | RESPIRATION RATE: 16 BRPM | DIASTOLIC BLOOD PRESSURE: 68 MMHG | HEART RATE: 79 BPM | TEMPERATURE: 97.9 F | HEIGHT: 73 IN | WEIGHT: 178 LBS | OXYGEN SATURATION: 95 %

## 2022-01-06 DIAGNOSIS — M25.552 CHRONIC LEFT HIP PAIN: ICD-10-CM

## 2022-01-06 DIAGNOSIS — M25.512 CHRONIC LEFT SHOULDER PAIN: ICD-10-CM

## 2022-01-06 DIAGNOSIS — F20.9 SCHIZOPHRENIA, UNSPECIFIED TYPE (HCC): ICD-10-CM

## 2022-01-06 DIAGNOSIS — G89.29 CHRONIC LEFT HIP PAIN: ICD-10-CM

## 2022-01-06 DIAGNOSIS — G89.29 CHRONIC LEFT SHOULDER PAIN: ICD-10-CM

## 2022-01-06 PROCEDURE — 700111 HCHG RX REV CODE 636 W/ 250 OVERRIDE (IP): Performed by: PHYSICIAN ASSISTANT

## 2022-01-06 PROCEDURE — 99213 OFFICE O/P EST LOW 20 MIN: CPT | Performed by: PHYSICIAN ASSISTANT

## 2022-01-06 RX ORDER — LURASIDONE HYDROCHLORIDE 20 MG/1
20 TABLET, FILM COATED ORAL
Qty: 60 TABLET | Refills: 3 | Status: SHIPPED | OUTPATIENT
Start: 2022-01-06 | End: 2022-08-05 | Stop reason: SDUPTHER

## 2022-01-06 RX ORDER — MELOXICAM 15 MG/1
15 TABLET ORAL DAILY
Qty: 30 TABLET | Refills: 3 | Status: SHIPPED | OUTPATIENT
Start: 2022-01-06 | End: 2022-05-05

## 2022-01-06 RX ORDER — KETOROLAC TROMETHAMINE 30 MG/ML
30 INJECTION, SOLUTION INTRAMUSCULAR; INTRAVENOUS ONCE
Status: COMPLETED | OUTPATIENT
Start: 2022-01-06 | End: 2022-01-06

## 2022-01-06 RX ADMIN — KETOROLAC TROMETHAMINE 30 MG: 30 INJECTION, SOLUTION INTRAMUSCULAR; INTRAVENOUS at 14:42

## 2022-01-06 ASSESSMENT — FIBROSIS 4 INDEX: FIB4 SCORE: 1.1

## 2022-01-06 NOTE — ASSESSMENT & PLAN NOTE
David presents today for follow up. He reports continued chronic hip pain. He takes meloxicam and flexeril as needed which has been beneficial for his pain. He reports that he was unable to establish care with Ortho due to his car breaking down.  He states his referral  and therefore cannot schedule an appointment for consultation.  He is requesting a new referral today in clinic.

## 2022-01-06 NOTE — PROGRESS NOTES
"Chief Complaint   Patient presents with   • Injections     Subjective:     HPI:   David Petty is a 56 y.o. male here to discuss the evaluation and management of:    Chronic left hip pain  David presents today for follow up. He reports continued chronic hip pain. He takes meloxicam and flexeril as needed which has been beneficial for his pain. He reports that he was unable to establish care with Ortho due to his car breaking down.  He states his referral  and therefore cannot schedule an appointment for consultation.  He is requesting a new referral today in clinic.    Chronic left shoulder pain  Onset/KORI: 1 month ago.  Location: Left anterior shoulder.   Duration: Constant.   Character: \"Electricity tingling feeling. Like when you hit your funny bone.\"   Alleviating factors: Meloxicam.   Aggravating factors: lying on the affected side, putting a shirt on (lifting above the head) abducting the arm.  Radiation: down the arm.   Treatments tried: Meloxicam.  No red flag signs.     ROS  See HPI.     No Known Allergies    Current medicines (including changes today)  Current Outpatient Medications   Medication Sig Dispense Refill   • meloxicam (MOBIC) 15 MG tablet Take 1 Tablet by mouth every day. 30 Tablet 3   • lurasidone (LATUDA) 20 MG Tab Take 1 Tablet by mouth with dinner. 60 Tablet 3   • varenicline (CHANTIX) 1 MG tablet Take 1 tablet by mouth 2 times a day. 60 tablet 3   • Cholecalciferol (VITAMIN D-3) 25 MCG (1000 UT) Cap Take 25 mcg by mouth every day. 60 capsule 5   • atorvastatin (LIPITOR) 10 MG Tab Take 1 tablet by mouth every day. 30 tablet 5   • cyclobenzaprine (FLEXERIL) 10 mg Tab Take 1 tablet by mouth 3 times a day as needed for Muscle Spasms. 90 tablet 5     No current facility-administered medications for this visit.     Social History     Tobacco Use   • Smoking status: Current Every Day Smoker     Packs/day: 1.00     Types: Cigarettes   • Smokeless tobacco: Never Used   Vaping Use   • Vaping " "Use: Never used   Substance Use Topics   • Alcohol use: Yes     Comment: Occasionally   • Drug use: Never     Patient Active Problem List    Diagnosis Date Noted   • Chronic left shoulder pain 01/06/2022   • Encounter for smoking cessation counseling 06/02/2021   • Dyslipidemia 06/02/2021   • Chronic left hip pain 04/27/2021   • Schizophrenia (HCC) 04/27/2021     Family History   Problem Relation Age of Onset   • Cancer Neg Hx    • Lung Disease Neg Hx    • Heart Disease Neg Hx    • Diabetes Neg Hx    • Hypertension Neg Hx    • Hyperlipidemia Neg Hx         Objective:     /68 (BP Location: Right arm, Patient Position: Sitting, BP Cuff Size: Adult)   Pulse 79   Temp 36.6 °C (97.9 °F) (Temporal)   Resp 16   Ht 1.854 m (6' 1\")   Wt 80.7 kg (178 lb)   SpO2 95%  Body mass index is 23.48 kg/m².    Physical Exam:  Physical Exam  Vitals reviewed.   Constitutional:       Appearance: Normal appearance.   HENT:      Head: Normocephalic.      Right Ear: External ear normal.      Left Ear: External ear normal.   Eyes:      Pupils: Pupils are equal, round, and reactive to light.   Cardiovascular:      Rate and Rhythm: Normal rate and regular rhythm.      Heart sounds: Normal heart sounds.   Pulmonary:      Effort: Pulmonary effort is normal.      Breath sounds: Normal breath sounds.   Musculoskeletal:      Left shoulder: Tenderness present. Decreased range of motion. Decreased strength.      Cervical back: Normal range of motion.   Neurological:      General: No focal deficit present.      Mental Status: He is alert and oriented to person, place, and time.       Assessment and Plan:     The following treatment plan was discussed:    1. Chronic left hip pain  - Plan: Continue on combination of meloxicam and muscle relaxer as needed.  Toradol injection given in clinic today.  He has been instructed to hold off on reinitiating meloxicam until least 48 hours from now.  New referral to Orthopedics has been placed.  - " meloxicam (MOBIC) 15 MG tablet; Take 1 Tablet by mouth every day.  Dispense: 30 Tablet; Refill: 3  - ketorolac (TORADOL) injection 30 mg    2. Chronic left shoulder pain  - This is a new condition.  - Plan: There is concern for internal derangement of the shoulder.  We will start by obtaining plain films of the left shoulder.  Meloxicam as needed.  - meloxicam (MOBIC) 15 MG tablet; Take 1 Tablet by mouth every day.  Dispense: 30 Tablet; Refill: 3  - DX-SHOULDER 2+ LEFT; Future    3. Schizophrenia, unspecified type (HCC)  - lurasidone (LATUDA) 20 MG Tab; Take 1 Tablet by mouth with dinner.  Dispense: 60 Tablet; Refill: 3    Any change or worsening of signs or symptoms, patient encouraged to follow-up or report to emergency room for further evaluation. Patient verbalizes understanding and agrees.    Follow-Up: Return if symptoms worsen or fail to improve.      PLEASE NOTE: This dictation was created using voice recognition software. I have made every reasonable attempt to correct obvious errors, but I expect that there are errors of grammar and possibly content that I did not discover before finalizing the note.

## 2022-01-06 NOTE — ASSESSMENT & PLAN NOTE
Onset/KROI: 1 month ago.  Location: Left anterior shoulder.   Duration: Constant.   Character: Electricity tingling feeling.   Alleviating factors: Meloxicam.   Aggravating factors: lying on the affected side, putting a shirt on (lifting above the head) abducting the arm.  Radiation: down the arm.   Treatments tried: Meloxicam.  No red flag signs.

## 2022-04-21 ENCOUNTER — OFFICE VISIT (OUTPATIENT)
Dept: URGENT CARE | Facility: CLINIC | Age: 57
End: 2022-04-21
Payer: MEDICAID

## 2022-04-21 VITALS
TEMPERATURE: 98.9 F | OXYGEN SATURATION: 95 % | RESPIRATION RATE: 18 BRPM | WEIGHT: 180.2 LBS | DIASTOLIC BLOOD PRESSURE: 74 MMHG | BODY MASS INDEX: 23.88 KG/M2 | SYSTOLIC BLOOD PRESSURE: 110 MMHG | HEIGHT: 73 IN | HEART RATE: 84 BPM

## 2022-04-21 DIAGNOSIS — G89.29 CHRONIC LEFT HIP PAIN: ICD-10-CM

## 2022-04-21 DIAGNOSIS — M25.552 CHRONIC LEFT HIP PAIN: ICD-10-CM

## 2022-04-21 PROCEDURE — 99214 OFFICE O/P EST MOD 30 MIN: CPT | Performed by: NURSE PRACTITIONER

## 2022-04-21 RX ORDER — CYCLOBENZAPRINE HCL 10 MG
10 TABLET ORAL 3 TIMES DAILY PRN
Qty: 30 TABLET | Refills: 0 | Status: SHIPPED | OUTPATIENT
Start: 2022-04-21 | End: 2022-05-05

## 2022-04-21 ASSESSMENT — FIBROSIS 4 INDEX: FIB4 SCORE: 1.1

## 2022-04-21 NOTE — PROGRESS NOTES
Chief Complaint   Patient presents with   • Hip Injury     Pt has (L) hip pain x 5 years        HISTORY OF PRESENT ILLNESS: Patient is a pleasant 56 y.o. male with a history of chronic hip pain who presents to urgent care today with concerns of recent exacerbation of hip pain.  He typically sees orthopedics for a cortisone shot and Flexeril for his pain but was unable to see orthopedics until next week.  Denies any recent injury or trauma.  Notes that pain has been worsening, making it difficult to sleep at night.  Flexeril typically helps with his pain.    Patient Active Problem List    Diagnosis Date Noted   • Chronic left shoulder pain 01/06/2022   • Encounter for smoking cessation counseling 06/02/2021   • Dyslipidemia 06/02/2021   • Chronic left hip pain 04/27/2021   • Schizophrenia (HCC) 04/27/2021       Allergies:Patient has no known allergies.    Current Outpatient Medications Ordered in Epic   Medication Sig Dispense Refill   • cyclobenzaprine (FLEXERIL) 10 mg Tab Take 1 Tablet by mouth 3 times a day as needed. 30 Tablet 0   • meloxicam (MOBIC) 15 MG tablet Take 1 Tablet by mouth every day. 30 Tablet 3   • lurasidone (LATUDA) 20 MG Tab Take 1 Tablet by mouth with dinner. 60 Tablet 3   • varenicline (CHANTIX) 1 MG tablet Take 1 tablet by mouth 2 times a day. 60 tablet 3   • Cholecalciferol (VITAMIN D-3) 25 MCG (1000 UT) Cap Take 25 mcg by mouth every day. 60 capsule 5   • atorvastatin (LIPITOR) 10 MG Tab Take 1 tablet by mouth every day. 30 tablet 5   • cyclobenzaprine (FLEXERIL) 10 mg Tab Take 1 tablet by mouth 3 times a day as needed for Muscle Spasms. 90 tablet 5     No current Epic-ordered facility-administered medications on file.       Past Medical History:   Diagnosis Date   • Anxiety    • Arthralgia of left hip    • Depression    • Schizophrenia (HCC)        Social History     Tobacco Use   • Smoking status: Current Every Day Smoker     Packs/day: 1.00     Types: Cigarettes   • Smokeless tobacco:  "Never Used   Vaping Use   • Vaping Use: Never used   Substance Use Topics   • Alcohol use: Yes     Comment: Occasionally   • Drug use: Never       Family Status   Relation Name Status   • Neg Hx  (Not Specified)     Family History   Problem Relation Age of Onset   • Cancer Neg Hx    • Lung Disease Neg Hx    • Heart Disease Neg Hx    • Diabetes Neg Hx    • Hypertension Neg Hx    • Hyperlipidemia Neg Hx        ROS:  Review of Systems   Constitutional: Negative for fever, chills, weight loss, malaise, and fatigue.   HENT: Negative for ear pain, nosebleeds, congestion, sore throat and neck pain.    Eyes: Negative for vision changes.   Neuro: Negative for headache, sensory changes, weakness, seizure, LOC.   Cardiovascular: Negative for chest pain, palpitations, orthopnea and leg swelling.   Respiratory: Negative for cough, sputum production, shortness of breath and wheezing.   Gastrointestinal: Negative for abdominal pain, nausea, vomiting or diarrhea.   Genitourinary: Negative for dysuria, urgency and frequency.  Musculoskeletal: Positive for hip pain.  Negative for falls, neck pain, back pain, myalgias.   Skin: Negative for rash, diaphoresis.     Exam:  /74 (BP Location: Left arm, Patient Position: Sitting, BP Cuff Size: Adult)   Pulse 84   Temp 37.2 °C (98.9 °F) (Temporal)   Resp 18   Ht 1.854 m (6' 1\")   Wt 81.7 kg (180 lb 3.2 oz)   SpO2 95%   General: well-nourished, well-developed male in NAD  Head: normocephalic, atraumatic  Eyes: PERRLA, no conjunctival injection, acuity grossly intact, lids normal.  Ears: normal shape and symmetry, no tenderness, no discharge. External canals are without any significant edema or erythema. Tympanic membranes are without any inflammation, no effusion. Gross auditory acuity is intact.  Nose: symmetrical without tenderness, no discharge.  Mouth/Throat: reasonable hygiene, no erythema, exudates or tonsillar enlargement.  Neck: no masses, range of motion within normal " limits, no tracheal deviation. No obvious thyroid enlargement.   Lymph: no cervical adenopathy. No supraclavicular adenopathy.   Neuro: alert and oriented. Cranial nerves 1-12 grossly intact. No sensory deficit.   Cardiovascular: regular rate and rhythm. No edema.  Pulmonary: no distress. Chest is symmetrical with respiration, no wheezes, crackles, or rhonchi.   Musculoskeletal: no clubbing, appropriate muscle tone, gait is antalgic.  Left hip: Normal in appearance, tenderness to lateral aspect, no obvious deformity.  Skin: warm, dry, intact, no clubbing, no cyanosis, no rashes.   Psych: appropriate mood, affect, judgement.         Assessment/Plan:  1. Chronic left hip pain  cyclobenzaprine (FLEXERIL) 10 mg Tab       Patient presents with chronic hip pain with acute exacerbation I have instructed the patient to follow-up with orthopedic next week as planned.  Short course of Flexeril be provided..   Supportive care, differential diagnoses, and indications for immediate follow-up discussed with patient.   Pathogenesis of diagnosis discussed including typical length and natural progression.   Instructed to return to clinic or nearest emergency department for any change in condition, further concerns, or worsening of symptoms.  Patient states understanding of the plan of care and discharge instructions.  Instructed to make an appointment, for follow up, with his primary care provider.        Please note that this dictation was created using voice recognition software. I have made every reasonable attempt to correct obvious errors, but I expect that there are errors of grammar and possibly content that I did not discover before finalizing the note.      NAOMI Quan.

## 2022-05-05 ENCOUNTER — OFFICE VISIT (OUTPATIENT)
Dept: MEDICAL GROUP | Facility: MEDICAL CENTER | Age: 57
End: 2022-05-05
Attending: PHYSICIAN ASSISTANT
Payer: MEDICAID

## 2022-05-05 VITALS
HEART RATE: 72 BPM | HEIGHT: 73 IN | DIASTOLIC BLOOD PRESSURE: 62 MMHG | RESPIRATION RATE: 16 BRPM | TEMPERATURE: 97.9 F | OXYGEN SATURATION: 96 % | SYSTOLIC BLOOD PRESSURE: 118 MMHG | WEIGHT: 181.4 LBS | BODY MASS INDEX: 24.04 KG/M2

## 2022-05-05 DIAGNOSIS — M25.552 CHRONIC LEFT HIP PAIN: ICD-10-CM

## 2022-05-05 DIAGNOSIS — M16.12 PRIMARY OSTEOARTHRITIS OF LEFT HIP: ICD-10-CM

## 2022-05-05 DIAGNOSIS — G89.29 CHRONIC LEFT HIP PAIN: ICD-10-CM

## 2022-05-05 DIAGNOSIS — Z12.11 COLON CANCER SCREENING: ICD-10-CM

## 2022-05-05 DIAGNOSIS — Z23 NEED FOR VACCINATION: ICD-10-CM

## 2022-05-05 DIAGNOSIS — Z71.6 ENCOUNTER FOR SMOKING CESSATION COUNSELING: ICD-10-CM

## 2022-05-05 DIAGNOSIS — J30.2 SEASONAL ALLERGIES: ICD-10-CM

## 2022-05-05 PROCEDURE — 90715 TDAP VACCINE 7 YRS/> IM: CPT

## 2022-05-05 PROCEDURE — 700111 HCHG RX REV CODE 636 W/ 250 OVERRIDE (IP): Performed by: PHYSICIAN ASSISTANT

## 2022-05-05 PROCEDURE — 99213 OFFICE O/P EST LOW 20 MIN: CPT | Mod: 25 | Performed by: PHYSICIAN ASSISTANT

## 2022-05-05 PROCEDURE — 99214 OFFICE O/P EST MOD 30 MIN: CPT | Performed by: PHYSICIAN ASSISTANT

## 2022-05-05 RX ORDER — NICOTINE 21 MG/24HR
1 PATCH, TRANSDERMAL 24 HOURS TRANSDERMAL EVERY 24 HOURS
Qty: 42 PATCH | Refills: 0 | Status: SHIPPED | OUTPATIENT
Start: 2022-05-05 | End: 2022-10-12

## 2022-05-05 RX ORDER — IBUPROFEN 800 MG/1
800 TABLET ORAL EVERY 8 HOURS PRN
Qty: 60 TABLET | Refills: 5 | Status: SHIPPED | OUTPATIENT
Start: 2022-05-05 | End: 2022-08-05

## 2022-05-05 RX ORDER — FLUTICASONE PROPIONATE 50 MCG
1 SPRAY, SUSPENSION (ML) NASAL DAILY
Qty: 16 G | Refills: 11 | Status: SHIPPED | OUTPATIENT
Start: 2022-05-05 | End: 2022-11-09 | Stop reason: SDUPTHER

## 2022-05-05 RX ORDER — CYCLOBENZAPRINE HCL 10 MG
10 TABLET ORAL 3 TIMES DAILY PRN
Qty: 30 TABLET | Refills: 2 | Status: SHIPPED | OUTPATIENT
Start: 2022-05-05 | End: 2022-08-05 | Stop reason: SDUPTHER

## 2022-05-05 RX ORDER — KETOROLAC TROMETHAMINE 30 MG/ML
30 INJECTION, SOLUTION INTRAMUSCULAR; INTRAVENOUS ONCE
Status: COMPLETED | OUTPATIENT
Start: 2022-05-05 | End: 2022-05-05

## 2022-05-05 RX ORDER — FEXOFENADINE HCL 180 MG/1
180 TABLET ORAL DAILY
Qty: 30 TABLET | Refills: 11 | Status: SHIPPED | OUTPATIENT
Start: 2022-05-05

## 2022-05-05 RX ADMIN — KETOROLAC TROMETHAMINE 30 MG: 30 INJECTION, SOLUTION INTRAMUSCULAR at 11:27

## 2022-05-05 ASSESSMENT — FIBROSIS 4 INDEX: FIB4 SCORE: 1.1

## 2022-05-05 NOTE — PROGRESS NOTES
Chief Complaint   Patient presents with   • Hip Pain     Subjective:     HPI:   David Petty is a 56 y.o. male here to discuss the evaluation and management of:    Chronic left hip pain  David presents today for follow up. He reports continued chronic left hip pain. He reports that he was told that he had significant OA of the hip/pelvis but was told that he was not a candidate for surgery at this time. He was told to follow up with ortho for further evaluation. He is requesting a Toradol injection today for pain and also reports muscle spasms at nighttime that keep his SO awake. He has tried flexeril in the past for this and reports improvement.     Encounter for smoking cessation counseling  David presents today for follow up. He reports that his Chantix was recalled and therefore, he had to stop taking the medication. He is requesting nicotine patches instead. He has cut down his smoking from 2 packs a day to 1 pack a day.     Seasonal allergies  David presents today for follow up. He reports onset of a left ear ache, sneezing and rhinorrhea that he contributes to uncontrolled allergies. He has not been taking any medications to treat this.     ROS  See HPI.      No Known Allergies    Current medicines (including changes today)  Current Outpatient Medications   Medication Sig Dispense Refill   • nicotine (NICODERM) 21 MG/24HR PATCH 24 HR Place 1 Patch on the skin every 24 hours. 42 Patch 0   • NON SPECIFIED Please administer Zoster vaccine. 1 Each 1   • ibuprofen (MOTRIN) 800 MG Tab Take 1 Tablet by mouth every 8 hours as needed for Moderate Pain or Headache. 60 Tablet 5   • cyclobenzaprine (FLEXERIL) 10 mg Tab Take 1 Tablet by mouth 3 times a day as needed for Muscle Spasms. 30 Tablet 2   • fexofenadine (ALLEGRA) 180 MG tablet Take 1 Tablet by mouth every day. 30 Tablet 11   • fluticasone (FLONASE) 50 MCG/ACT nasal spray Administer 1 Spray into affected nostril(S) every day. 16 g 11   • lurasidone (LATUDA) 20 MG  "Tab Take 1 Tablet by mouth with dinner. 60 Tablet 3   • varenicline (CHANTIX) 1 MG tablet Take 1 tablet by mouth 2 times a day. 60 tablet 3   • Cholecalciferol (VITAMIN D-3) 25 MCG (1000 UT) Cap Take 25 mcg by mouth every day. 60 capsule 5   • atorvastatin (LIPITOR) 10 MG Tab Take 1 tablet by mouth every day. 30 tablet 5     No current facility-administered medications for this visit.     Social History     Tobacco Use   • Smoking status: Current Every Day Smoker     Packs/day: 1.00     Types: Cigarettes   • Smokeless tobacco: Never Used   Vaping Use   • Vaping Use: Never used   Substance Use Topics   • Alcohol use: Yes     Comment: Occasionally   • Drug use: Never     Patient Active Problem List    Diagnosis Date Noted   • Seasonal allergies 05/05/2022   • Chronic left shoulder pain 01/06/2022   • Encounter for smoking cessation counseling 06/02/2021   • Dyslipidemia 06/02/2021   • Chronic left hip pain 04/27/2021   • Schizophrenia (HCC) 04/27/2021     Family History   Problem Relation Age of Onset   • Cancer Neg Hx    • Lung Disease Neg Hx    • Heart Disease Neg Hx    • Diabetes Neg Hx    • Hypertension Neg Hx    • Hyperlipidemia Neg Hx       Objective:     /62   Pulse 72   Temp 36.6 °C (97.9 °F)   Resp 16   Ht 1.854 m (6' 0.99\")   Wt 82.3 kg (181 lb 6.4 oz)   SpO2 96%  Body mass index is 23.94 kg/m².    Physical Exam:  Physical Exam    Assessment and Plan:     The following treatment plan was discussed:    1. Chronic left hip pain  - This is a chronic condition.  - Plan: Toradol injection given to the patient in clinic today. Referral to Orthopedics for further evaluation. May start on ibuprofen as needed after 5 days post injection. Start taking Flexeril before bedtime for muscle spasms.  - ketorolac (TORADOL) injection 30 mg  - ibuprofen (MOTRIN) 800 MG Tab; Take 1 Tablet by mouth every 8 hours as needed for Moderate Pain or Headache.  Dispense: 60 Tablet; Refill: 5  - cyclobenzaprine (FLEXERIL) 10 " mg Tab; Take 1 Tablet by mouth 3 times a day as needed for Muscle Spasms.  Dispense: 30 Tablet; Refill: 2    2. Encounter for smoking cessation counseling  - Plan: Start on high dose nicotine patches once daily for the next 4 weeks. Follow up in 5 weeks for med check and step down therapy.   - nicotine (NICODERM) 21 MG/24HR PATCH 24 HR; Place 1 Patch on the skin every 24 hours.  Dispense: 42 Patch; Refill: 0    3. Seasonal allergies  - This is a chronic uncontrolled condition.  - Plan: Start on a combination of Allegra and flonase daily for the next 6 weeks. Follow up at that time for med check.   - fexofenadine (ALLEGRA) 180 MG tablet; Take 1 Tablet by mouth every day.  Dispense: 30 Tablet; Refill: 11  - fluticasone (FLONASE) 50 MCG/ACT nasal spray; Administer 1 Spray into affected nostril(S) every day.  Dispense: 16 g; Refill: 11    4. Colon cancer screening  - Referral to GI for Colonoscopy    5. Need for vaccination  - Tdap Vaccine =>6YO IM  - NON SPECIFIED; Please administer Zoster vaccine.  Dispense: 1 Each; Refill: 1    6. Primary osteoarthritis of left hip  - Referral to Orthopedics     Any change or worsening of signs or symptoms, patient encouraged to follow-up or report to emergency room for further evaluation. Patient verbalizes understanding and agrees.    Follow-Up: Return in about 5 weeks (around 6/9/2022) for Med check.      PLEASE NOTE: This dictation was created using voice recognition software. I have made every reasonable attempt to correct obvious errors, but I expect that there are errors of grammar and possibly content that I did not discover before finalizing the note.

## 2022-05-05 NOTE — ASSESSMENT & PLAN NOTE
David presents today for follow up. He reports onset of a left ear ache, sneezing and rhinorrhea that he contributes to uncontrolled allergies. He has not been taking any medications to treat this.

## 2022-05-05 NOTE — ASSESSMENT & PLAN NOTE
David presents today for follow up. He reports that his Chantix was recalled and therefore, he had to stop taking the medication. He is requesting nicotine patches instead. He has cut down his smoking from 2 packs a day to 1 pack a day.

## 2022-05-05 NOTE — ASSESSMENT & PLAN NOTE
David presents today for follow up. He reports continued chronic left hip pain. He reports that he was told that he had significant OA of the hip/pelvis but was told that he was not a candidate for surgery at this time. He was told to follow up with ortho for further evaluation. He is requesting a Toradol injection today for pain and also reports muscle spasms at nighttime that keep his SO awake. He has tried flexeril in the past for this and reports improvement.

## 2022-08-03 ENCOUNTER — TELEPHONE (OUTPATIENT)
Dept: SCHEDULING | Facility: IMAGING CENTER | Age: 57
End: 2022-08-03

## 2022-08-05 ENCOUNTER — OFFICE VISIT (OUTPATIENT)
Dept: MEDICAL GROUP | Facility: MEDICAL CENTER | Age: 57
End: 2022-08-05
Attending: INTERNAL MEDICINE
Payer: MEDICAID

## 2022-08-05 VITALS
WEIGHT: 179 LBS | TEMPERATURE: 97.9 F | RESPIRATION RATE: 16 BRPM | DIASTOLIC BLOOD PRESSURE: 74 MMHG | HEIGHT: 73 IN | SYSTOLIC BLOOD PRESSURE: 116 MMHG | BODY MASS INDEX: 23.72 KG/M2 | OXYGEN SATURATION: 98 % | HEART RATE: 86 BPM

## 2022-08-05 DIAGNOSIS — M16.12 PRIMARY OSTEOARTHRITIS OF LEFT HIP: ICD-10-CM

## 2022-08-05 DIAGNOSIS — G89.29 CHRONIC LEFT HIP PAIN: ICD-10-CM

## 2022-08-05 DIAGNOSIS — F20.9 SCHIZOPHRENIA, UNSPECIFIED TYPE (HCC): ICD-10-CM

## 2022-08-05 DIAGNOSIS — M25.552 CHRONIC LEFT HIP PAIN: ICD-10-CM

## 2022-08-05 DIAGNOSIS — Z87.81 H/O FRACTURE OF FEMUR: ICD-10-CM

## 2022-08-05 PROCEDURE — 700111 HCHG RX REV CODE 636 W/ 250 OVERRIDE (IP): Performed by: INTERNAL MEDICINE

## 2022-08-05 PROCEDURE — 96372 THER/PROPH/DIAG INJ SC/IM: CPT | Performed by: INTERNAL MEDICINE

## 2022-08-05 PROCEDURE — 99213 OFFICE O/P EST LOW 20 MIN: CPT | Performed by: INTERNAL MEDICINE

## 2022-08-05 PROCEDURE — 99214 OFFICE O/P EST MOD 30 MIN: CPT | Performed by: INTERNAL MEDICINE

## 2022-08-05 RX ORDER — KETOROLAC TROMETHAMINE 30 MG/ML
60 INJECTION, SOLUTION INTRAMUSCULAR; INTRAVENOUS ONCE
Status: COMPLETED | OUTPATIENT
Start: 2022-08-05 | End: 2022-08-05

## 2022-08-05 RX ORDER — MELOXICAM 15 MG/1
15 TABLET ORAL
Qty: 30 TABLET | Refills: 1 | Status: SHIPPED | OUTPATIENT
Start: 2022-08-05 | End: 2022-10-12 | Stop reason: SDUPTHER

## 2022-08-05 RX ORDER — KETOROLAC TROMETHAMINE 30 MG/ML
30 INJECTION, SOLUTION INTRAMUSCULAR; INTRAVENOUS ONCE
Qty: 2 ML | Refills: 0 | Status: SHIPPED | OUTPATIENT
Start: 2022-08-05 | End: 2022-08-05

## 2022-08-05 RX ORDER — LURASIDONE HYDROCHLORIDE 20 MG/1
20 TABLET, FILM COATED ORAL
Qty: 30 TABLET | Refills: 1 | Status: SHIPPED | OUTPATIENT
Start: 2022-08-05 | End: 2022-10-12 | Stop reason: SDUPTHER

## 2022-08-05 RX ORDER — CYCLOBENZAPRINE HCL 10 MG
10 TABLET ORAL 3 TIMES DAILY PRN
Qty: 30 TABLET | Refills: 1 | Status: SHIPPED | OUTPATIENT
Start: 2022-08-05 | End: 2022-10-12 | Stop reason: SDUPTHER

## 2022-08-05 RX ADMIN — KETOROLAC TROMETHAMINE 30 MG: 30 INJECTION, SOLUTION INTRAMUSCULAR; INTRAVENOUS at 14:06

## 2022-08-05 ASSESSMENT — FIBROSIS 4 INDEX: FIB4 SCORE: 1.12

## 2022-08-05 NOTE — PROGRESS NOTES
Subjective:   David Petty is a 57 y.o. male here today for medication refills, hip pain    Schizophrenia (HCC)  He currently takes Latuda 20 mg daily.  He is requesting a refill on this medication.  He reports good control of his symptoms with this therapy.    Chronic left hip pain  He continues to report chronic left hip pain.  He states that about 7 months ago he was evaluated with an x-ray by orthopedic and was told that he was not a candidate for hip replacement but that he had significant arthritis.  Since then, he has been managing his pain with Flexeril and ibuprofen.  He has been taking up to 1600 mg of ibuprofen at a time but not exceeding 3 of the 800 mg tablets in a day.  Despite this, he does not feel like it helps very much but he does benefit from the Toradol.  States that he did physical therapy for 1 session but then failed to show up for additional sessions and was discharged.  He would like to resume physical therapy.  Pain is worst with hip flexion.       Current medicines (including changes today)  Current Outpatient Medications   Medication Sig Dispense Refill   • cyclobenzaprine (FLEXERIL) 10 mg Tab Take 1 Tablet by mouth 3 times a day as needed for Muscle Spasms. 30 Tablet 1   • lurasidone (LATUDA) 20 MG Tab Take 1 Tablet by mouth with dinner. 30 Tablet 1   • ketorolac (TORADOL) 30 MG/ML Solution Inject 1 mL into the shoulder, thigh, or buttocks one time for 1 dose. 2 mL 0   • meloxicam (MOBIC) 15 MG tablet Take 1 Tablet by mouth 1 time a day as needed for Severe Pain. 30 Tablet 1   • nicotine (NICODERM) 21 MG/24HR PATCH 24 HR Place 1 Patch on the skin every 24 hours. 42 Patch 0   • NON SPECIFIED Please administer Zoster vaccine. 1 Each 1   • fexofenadine (ALLEGRA) 180 MG tablet Take 1 Tablet by mouth every day. 30 Tablet 11   • fluticasone (FLONASE) 50 MCG/ACT nasal spray Administer 1 Spray into affected nostril(S) every day. 16 g 11   • varenicline (CHANTIX) 1 MG tablet Take 1 tablet by  mouth 2 times a day. 60 tablet 3   • Cholecalciferol (VITAMIN D-3) 25 MCG (1000 UT) Cap Take 25 mcg by mouth every day. 60 capsule 5   • atorvastatin (LIPITOR) 10 MG Tab Take 1 tablet by mouth every day. 30 tablet 5     No current facility-administered medications for this visit.     He  has a past medical history of Anxiety, Arthralgia of left hip, Depression, and Schizophrenia ([facility]).         Objective:     Vitals:    08/05/22 1511   BP: 116/74   Pulse: 86   Resp: 16   Temp: 36.6 °C (97.9 °F)   SpO2: 98%     Body mass index is 23.62 kg/m².   Physical Exam:  Constitutional: Alert, no distress.  Skin: Warm, dry, good turgor, no rashes in visible areas.  Eye: Equal, round and reactive, conjunctiva clear, lids normal.  Psych: Alert and oriented x3, normal affect and mood.      Assessment and Plan:   The following treatment plan was discussed    1. Chronic left hip pain  I have refilled his Flexeril.  We will switch to meloxicam from ibuprofen.  Toradol injection given today and he is aware that he should not take meloxicam or ibuprofen today.  I have placed a new referral to physical therapy.  He will continue to follow-up with orthopedics.  - cyclobenzaprine (FLEXERIL) 10 mg Tab; Take 1 Tablet by mouth 3 times a day as needed for Muscle Spasms.  Dispense: 30 Tablet; Refill: 1  - ketorolac (TORADOL) 30 MG/ML Solution; Inject 1 mL into the shoulder, thigh, or buttocks one time for 1 dose.  Dispense: 2 mL; Refill: 0  - meloxicam (MOBIC) 15 MG tablet; Take 1 Tablet by mouth 1 time a day as needed for Severe Pain.  Dispense: 30 Tablet; Refill: 1  - ketorolac (TORADOL) injection 60 mg  - Referral to Physical Therapy    2. Schizophrenia, unspecified type ([facility])  Medication refilled today  - lurasidone (LATUDA) 20 MG Tab; Take 1 Tablet by mouth with dinner.  Dispense: 30 Tablet; Refill: 1    3. H/O fracture of femur  - Referral to Physical Therapy    4. Primary osteoarthritis of left hip  - Referral to Physical  Therapy        Followup: Return if symptoms worsen or fail to improve.

## 2022-08-05 NOTE — ASSESSMENT & PLAN NOTE
He continues to report chronic left hip pain.  He states that about 7 months ago he was evaluated with an x-ray by orthopedic and was told that he was not a candidate for hip replacement but that he had significant arthritis.  Since then, he has been managing his pain with Flexeril and ibuprofen.  He has been taking up to 1600 mg of ibuprofen at a time but not exceeding 3 of the 800 mg tablets in a day.  Despite this, he does not feel like it helps very much but he does benefit from the Toradol.  States that he did physical therapy for 1 session but then failed to show up for additional sessions and was discharged.  He would like to resume physical therapy.  Pain is worst with hip flexion.

## 2022-08-05 NOTE — ASSESSMENT & PLAN NOTE
He currently takes Latuda 20 mg daily.  He is requesting a refill on this medication.  He reports good control of his symptoms with this therapy.

## 2022-08-31 ENCOUNTER — TELEPHONE (OUTPATIENT)
Dept: SCHEDULING | Facility: IMAGING CENTER | Age: 57
End: 2022-08-31

## 2022-09-22 ENCOUNTER — TELEPHONE (OUTPATIENT)
Dept: MEDICAL GROUP | Facility: MEDICAL CENTER | Age: 57
End: 2022-09-22
Payer: MEDICAID

## 2022-09-22 NOTE — TELEPHONE ENCOUNTER
GI consultants called, stated there was a referral done for this patient for a colonoscopy screening but they are unable to do the procedure. Patient is having abdominal pain and rectal bleeding which the referral was not placed with these diagnosis. Patient first has to see the provider for evaluation.  Gi wanted to make sure we knew if a patient is having any kind of symptom it needs to be in the diagnosis and referral should not say colon cancer screening.

## 2022-09-23 NOTE — TELEPHONE ENCOUNTER
Would like to see the patient in clinic for the symptoms.  If the abdominal pain and rectal bleeding is worsening then I would like him to get in sooner than October 12.  If the bleeding and pain are minimal or not worsening, then we can wait until October 12.  I will still see him as a new patient on that day but if he needs to be seen sooner, please schedule him for an urgent visit with any available provider just to address this issue.    Meseret Aguila M.D.

## 2022-10-12 ENCOUNTER — OFFICE VISIT (OUTPATIENT)
Dept: MEDICAL GROUP | Facility: MEDICAL CENTER | Age: 57
End: 2022-10-12
Attending: INTERNAL MEDICINE
Payer: MEDICAID

## 2022-10-12 VITALS
WEIGHT: 187 LBS | HEART RATE: 68 BPM | TEMPERATURE: 98.1 F | OXYGEN SATURATION: 97 % | SYSTOLIC BLOOD PRESSURE: 118 MMHG | HEIGHT: 73 IN | BODY MASS INDEX: 24.78 KG/M2 | DIASTOLIC BLOOD PRESSURE: 78 MMHG | RESPIRATION RATE: 16 BRPM

## 2022-10-12 DIAGNOSIS — M25.552 CHRONIC LEFT HIP PAIN: ICD-10-CM

## 2022-10-12 DIAGNOSIS — J30.2 SEASONAL ALLERGIES: ICD-10-CM

## 2022-10-12 DIAGNOSIS — M16.12 PRIMARY OSTEOARTHRITIS OF LEFT HIP: ICD-10-CM

## 2022-10-12 DIAGNOSIS — E55.9 VITAMIN D DEFICIENCY: ICD-10-CM

## 2022-10-12 DIAGNOSIS — Z13.1 SCREENING FOR DIABETES MELLITUS: ICD-10-CM

## 2022-10-12 DIAGNOSIS — F10.21 ALCOHOLISM IN REMISSION (HCC): ICD-10-CM

## 2022-10-12 DIAGNOSIS — E78.5 DYSLIPIDEMIA: ICD-10-CM

## 2022-10-12 DIAGNOSIS — Z72.0 TOBACCO USE: ICD-10-CM

## 2022-10-12 DIAGNOSIS — G89.29 CHRONIC LEFT HIP PAIN: ICD-10-CM

## 2022-10-12 DIAGNOSIS — F20.9 SCHIZOPHRENIA, UNSPECIFIED TYPE (HCC): ICD-10-CM

## 2022-10-12 DIAGNOSIS — H92.02 LEFT EAR PAIN: ICD-10-CM

## 2022-10-12 DIAGNOSIS — Z87.81 H/O FRACTURE OF FEMUR: ICD-10-CM

## 2022-10-12 PROBLEM — M25.512 CHRONIC LEFT SHOULDER PAIN: Status: RESOLVED | Noted: 2022-01-06 | Resolved: 2022-10-12

## 2022-10-12 PROBLEM — Z71.6 ENCOUNTER FOR SMOKING CESSATION COUNSELING: Status: RESOLVED | Noted: 2021-06-02 | Resolved: 2022-10-12

## 2022-10-12 PROCEDURE — 99214 OFFICE O/P EST MOD 30 MIN: CPT | Performed by: INTERNAL MEDICINE

## 2022-10-12 PROCEDURE — 700111 HCHG RX REV CODE 636 W/ 250 OVERRIDE (IP): Performed by: INTERNAL MEDICINE

## 2022-10-12 PROCEDURE — 96372 THER/PROPH/DIAG INJ SC/IM: CPT | Performed by: INTERNAL MEDICINE

## 2022-10-12 PROCEDURE — 99213 OFFICE O/P EST LOW 20 MIN: CPT | Performed by: INTERNAL MEDICINE

## 2022-10-12 RX ORDER — NICOTINE 21 MG/24HR
1 PATCH, TRANSDERMAL 24 HOURS TRANSDERMAL EVERY 24 HOURS
Qty: 28 PATCH | Refills: 0 | Status: SHIPPED | OUTPATIENT
Start: 2022-10-12 | End: 2022-11-02 | Stop reason: SDUPTHER

## 2022-10-12 RX ORDER — CYCLOBENZAPRINE HCL 10 MG
10 TABLET ORAL 3 TIMES DAILY PRN
Qty: 30 TABLET | Refills: 5 | Status: SHIPPED | OUTPATIENT
Start: 2022-10-12 | End: 2022-10-12 | Stop reason: SDUPTHER

## 2022-10-12 RX ORDER — LURASIDONE HYDROCHLORIDE 20 MG/1
20 TABLET, FILM COATED ORAL
Qty: 30 TABLET | Refills: 5 | Status: SHIPPED | OUTPATIENT
Start: 2022-10-12 | End: 2023-08-03 | Stop reason: SDUPTHER

## 2022-10-12 RX ORDER — MELOXICAM 15 MG/1
15 TABLET ORAL
Qty: 30 TABLET | Refills: 5 | Status: SHIPPED | OUTPATIENT
Start: 2022-10-12 | End: 2023-08-03 | Stop reason: SDUPTHER

## 2022-10-12 RX ORDER — CYCLOBENZAPRINE HCL 10 MG
10 TABLET ORAL 3 TIMES DAILY PRN
Qty: 30 TABLET | Refills: 5 | Status: SHIPPED | OUTPATIENT
Start: 2022-10-12 | End: 2022-11-09 | Stop reason: SDUPTHER

## 2022-10-12 RX ORDER — ATORVASTATIN CALCIUM 20 MG/1
20 TABLET, FILM COATED ORAL NIGHTLY
Qty: 30 TABLET | Refills: 5 | Status: SHIPPED | OUTPATIENT
Start: 2022-10-12 | End: 2022-10-12 | Stop reason: SDUPTHER

## 2022-10-12 RX ORDER — ATORVASTATIN CALCIUM 20 MG/1
20 TABLET, FILM COATED ORAL NIGHTLY
Qty: 30 TABLET | Refills: 5 | Status: SHIPPED | OUTPATIENT
Start: 2022-10-12 | End: 2023-11-09

## 2022-10-12 RX ORDER — KETOROLAC TROMETHAMINE 30 MG/ML
30 INJECTION, SOLUTION INTRAMUSCULAR; INTRAVENOUS ONCE
Status: COMPLETED | OUTPATIENT
Start: 2022-10-12 | End: 2022-10-12

## 2022-10-12 RX ORDER — MELOXICAM 15 MG/1
15 TABLET ORAL
Qty: 30 TABLET | Refills: 5 | Status: SHIPPED | OUTPATIENT
Start: 2022-10-12 | End: 2022-10-12 | Stop reason: SDUPTHER

## 2022-10-12 RX ORDER — LURASIDONE HYDROCHLORIDE 20 MG/1
20 TABLET, FILM COATED ORAL
Qty: 30 TABLET | Refills: 5 | Status: SHIPPED | OUTPATIENT
Start: 2022-10-12 | End: 2022-10-12 | Stop reason: SDUPTHER

## 2022-10-12 RX ORDER — NICOTINE 21 MG/24HR
1 PATCH, TRANSDERMAL 24 HOURS TRANSDERMAL EVERY 24 HOURS
Qty: 28 PATCH | Refills: 0 | Status: SHIPPED | OUTPATIENT
Start: 2022-10-12 | End: 2022-10-12 | Stop reason: SDUPTHER

## 2022-10-12 RX ADMIN — KETOROLAC TROMETHAMINE 30 MG: 30 INJECTION, SOLUTION INTRAMUSCULAR; INTRAVENOUS at 11:26

## 2022-10-12 ASSESSMENT — FIBROSIS 4 INDEX: FIB4 SCORE: 1.12

## 2022-10-12 NOTE — ASSESSMENT & PLAN NOTE
H/o femur fracture with chronic pain x 5 years.  Saw orthopedics once over a year ago.  They requested updated imaging (I do not see this in our system) and then f/u in 4 weeks as well as PT but patient did not follow up.  Continues to have daily pain somewhat controlled with flexeril and meloxicam.  Would like a toradol shot today as he usually gets this during visits and it helps a lot.  Is interested in seeing ortho and PT.

## 2022-10-12 NOTE — ASSESSMENT & PLAN NOTE
Smoking 1 cig/day, down from 1 ppd.  Has been using nicotine patch 21 mg/day, ready to start tapering down.

## 2022-10-12 NOTE — PROGRESS NOTES
David Petty is a 57 y.o. male here for ear pain, chronic issues below, est care  HPI:  previous PCP was Tawana Sen  Left ear pain  Reports off and on pain behind the left ear for a few month.  Put peroxide in the ear last night without improvement.  Having dental pain and needs full extraction.  No drainage from the ear or hearing changes.  H/o otitis media in childhood.       Schizophrenia ([facility])  Has had for many years.  On latuda and feels symptoms are well controlled.  Is requesting refills. Does not see psychiatry.    Alcoholism in remission ([facility])  Was drinking 18 beers a day until June 2022.  Has maintained sobriety since then.    Vitamin D deficiency  Taking 1000 units D daily.  Last labs May of 2021 showed D level of 15.    Tobacco use  Smoking 1 cig/day, down from 1 ppd.  Has been using nicotine patch 21 mg/day, ready to start tapering down.    Seasonal allergies  Continues on allegra and flonase which are working well.    Primary osteoarthritis of left hip  H/o femur fracture with chronic pain x 5 years.  Saw orthopedics once over a year ago.  They requested updated imaging (I do not see this in our system) and then f/u in 4 weeks as well as PT but patient did not follow up.  Continues to have daily pain somewhat controlled with flexeril and meloxicam.  Would like a toradol shot today as he usually gets this during visits and it helps a lot.  Is interested in seeing ortho and PT.      Dyslipidemia  Was prescribed atorvastatin but states he never picked it up at the pharmacy.  Requesting new rx  The 10-year ASCVD risk score (Danny DC Jr., et al., 2013) is: 14.3%    Current medicines (including changes today)  Current Outpatient Medications   Medication Sig Dispense Refill    cyclobenzaprine (FLEXERIL) 10 mg Tab Take 1 Tablet by mouth 3 times a day as needed for Muscle Spasms. 30 Tablet 5    atorvastatin (LIPITOR) 20 MG Tab Take 1 Tablet by mouth every evening. 30 Tablet 5    lurasidone (LATUDA) 20 MG  Tab Take 1 Tablet by mouth with dinner. 30 Tablet 5    meloxicam (MOBIC) 15 MG tablet Take 1 Tablet by mouth 1 time a day as needed for Severe Pain. 30 Tablet 5    nicotine (NICODERM) 14 MG/24HR PATCH 24 HR Place 1 Patch on the skin every 24 hours. 28 Patch 0    nicotine (NICODERM) 7 MG/24HR PATCH 24 HR Place 1 Patch on the skin every 24 hours. 28 Patch 0    fexofenadine (ALLEGRA) 180 MG tablet Take 1 Tablet by mouth every day. 30 Tablet 11    fluticasone (FLONASE) 50 MCG/ACT nasal spray Administer 1 Spray into affected nostril(S) every day. 16 g 11    Cholecalciferol (VITAMIN D-3) 25 MCG (1000 UT) Cap Take 25 mcg by mouth every day. 60 capsule 5     No current facility-administered medications for this visit.     He  has a past medical history of Allergy, Anxiety, Arthralgia of left hip, Depression, Hyperlipidemia, and Schizophrenia (HCC).  He  has a past surgical history that includes appendectomy; arthroplasty; and other abdominal surgery.  Social History     Tobacco Use    Smoking status: Every Day     Years: 41.00     Types: Cigarettes    Smokeless tobacco: Never    Tobacco comments:     One cig/day, previous 1 ppd   Vaping Use    Vaping Use: Never used   Substance Use Topics    Alcohol use: Not Currently     Comment: history of alcoholism, was drinking 18 beers daily until June 2022, started drinking as teenager    Drug use: Not Currently     Types: Marijuana     Comment: h/o MJ, quit June 2022     Social History     Social History Narrative    Not on file     Family History   Problem Relation Age of Onset    Diabetes Maternal Grandmother     Cancer Neg Hx     Lung Disease Neg Hx     Heart Disease Neg Hx     Hypertension Neg Hx     Hyperlipidemia Neg Hx     Stroke Neg Hx           Objective:     Vitals:    10/12/22 1008   BP: 118/78   Pulse: 68   Resp: 16   Temp: 36.7 °C (98.1 °F)   SpO2: 97%     Body mass index is 24.68 kg/m².  Physical Exam:    Constitutional: Alert, no distress.  Skin: Warm, dry, good  turgor, no rashes in visible areas.  Eye: Equal, round and reactive, conjunctiva clear, lids normal.  ENMT: Lips without lesions, very poor dentition, oropharynx clear, TM's clear bilaterally, tender just below left mandible but not over mastoid.  Neck: Trachea midline, no masses, no thyromegaly. No cervical or supraclavicular lymphadenopathy.  Respiratory: Unlabored respiratory effort, lungs clear to auscultation, no wheezes, no ronchi.  Cardiovascular: Regular rate and rhythm, no murmurs appreciated, no lower extremity edema.  Abdomen: Soft, non-tender, no masses, no hepatosplenomegaly.  Psych: Alert and oriented x3, odd affect and mood.  MSK: 4+/5 L hip flexor and left knee extensor strength otherwise 5/5 b/l LE strength, walks with a limp, positive FABR test      Assessment and Plan:   The following treatment plan was discussed    1. Chronic left hip pain  Needs updated imaging, PT, and orthopedics.  May benefit from total hip.  Meds refilled, toradol give, needs updated imaging.  - cyclobenzaprine (FLEXERIL) 10 mg Tab; Take 1 Tablet by mouth 3 times a day as needed for Muscle Spasms.  Dispense: 30 Tablet; Refill: 5  - meloxicam (MOBIC) 15 MG tablet; Take 1 Tablet by mouth 1 time a day as needed for Severe Pain.  Dispense: 30 Tablet; Refill: 5  - Referral to Physical Therapy  - Referral to Orthopedics  - DX-HIP-COMPLETE - UNILATERAL 2+ LEFT; Future  - DX-LUMBAR SPINE-2 OR 3 VIEWS; Future  - ketorolac (TORADOL) injection 30 mg  - ketorolac (TORADOL) injection 30 mg    2. Dyslipidemia  Will resend statin, restart  - atorvastatin (LIPITOR) 20 MG Tab; Take 1 Tablet by mouth every evening.  Dispense: 30 Tablet; Refill: 5  - Lipid Profile; Future    3. Schizophrenia, unspecified type (HCC)  Stable, meds refilled  - lurasidone (LATUDA) 20 MG Tab; Take 1 Tablet by mouth with dinner.  Dispense: 30 Tablet; Refill: 5    4. Alcoholism in remission (HCC)  - Comp Metabolic Panel; Future    5. Left ear pain  TM normal, suspect  pain is dental in nature.  Gave handout for dental providers.    6. Tobacco use  Discussed weaning down on nicotine patch  - nicotine (NICODERM) 14 MG/24HR PATCH 24 HR; Place 1 Patch on the skin every 24 hours.  Dispense: 28 Patch; Refill: 0  - nicotine (NICODERM) 7 MG/24HR PATCH 24 HR; Place 1 Patch on the skin every 24 hours.  Dispense: 28 Patch; Refill: 0    7. H/O fracture of femur  - Referral to Physical Therapy  - Referral to Orthopedics  - DX-HIP-COMPLETE - UNILATERAL 2+ LEFT; Future    8. Primary osteoarthritis of left hip  - Referral to Physical Therapy  - Referral to Orthopedics  - DX-HIP-COMPLETE - UNILATERAL 2+ LEFT; Future  - ketorolac (TORADOL) injection 30 mg  - ketorolac (TORADOL) injection 30 mg    9. Vitamin D deficiency  -cont daily vit D  - VITAMIN D,25 HYDROXY (DEFICIENCY); Future    10. Screening for diabetes mellitus  - HEMOGLOBIN A1C; Future    11. Seasonal allergies  Stable, cont current meds.        Followup: Return in about 2 months (around 12/12/2022).

## 2022-10-12 NOTE — ASSESSMENT & PLAN NOTE
Was prescribed atorvastatin but states he never picked it up at the pharmacy.  Requesting new rx  The 10-year ASCVD risk score (Napachritsine RICHMOND Jr., et al., 2013) is: 14.3%

## 2022-10-12 NOTE — ASSESSMENT & PLAN NOTE
Has had for many years.  On latuda and feels symptoms are well controlled.  Is requesting refills. Does not see psychiatry.

## 2022-10-12 NOTE — ASSESSMENT & PLAN NOTE
Reports off and on pain behind the left ear for a few month.  Put peroxide in the ear last night without improvement.  Having dental pain and needs full extraction.  No drainage from the ear or hearing changes.  H/o otitis media in childhood.

## 2022-10-18 ENCOUNTER — APPOINTMENT (OUTPATIENT)
Dept: RADIOLOGY | Facility: MEDICAL CENTER | Age: 57
End: 2022-10-18
Attending: INTERNAL MEDICINE
Payer: MEDICAID

## 2022-10-18 DIAGNOSIS — M25.552 CHRONIC LEFT HIP PAIN: ICD-10-CM

## 2022-10-18 DIAGNOSIS — G89.29 CHRONIC LEFT HIP PAIN: ICD-10-CM

## 2022-10-18 DIAGNOSIS — M16.12 PRIMARY OSTEOARTHRITIS OF LEFT HIP: ICD-10-CM

## 2022-10-18 DIAGNOSIS — Z87.81 H/O FRACTURE OF FEMUR: ICD-10-CM

## 2022-10-18 PROCEDURE — 73501 X-RAY EXAM HIP UNI 1 VIEW: CPT | Mod: LT

## 2022-10-18 PROCEDURE — 72100 X-RAY EXAM L-S SPINE 2/3 VWS: CPT

## 2022-10-21 ENCOUNTER — PHYSICAL THERAPY (OUTPATIENT)
Dept: PHYSICAL THERAPY | Facility: MEDICAL CENTER | Age: 57
End: 2022-10-21
Attending: INTERNAL MEDICINE
Payer: MEDICAID

## 2022-10-21 DIAGNOSIS — Z87.81 H/O FRACTURE OF FEMUR: ICD-10-CM

## 2022-10-21 DIAGNOSIS — M25.552 CHRONIC LEFT HIP PAIN: ICD-10-CM

## 2022-10-21 DIAGNOSIS — M16.12 PRIMARY OSTEOARTHRITIS OF LEFT HIP: ICD-10-CM

## 2022-10-21 DIAGNOSIS — G89.29 CHRONIC LEFT HIP PAIN: ICD-10-CM

## 2022-10-21 PROCEDURE — 97162 PT EVAL MOD COMPLEX 30 MIN: CPT

## 2022-10-21 ASSESSMENT — ENCOUNTER SYMPTOMS
PAIN SCALE AT LOWEST: 7
PAIN SCALE: 8
PAIN SCALE AT HIGHEST: 10
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
## 2022-11-02 ENCOUNTER — TELEPHONE (OUTPATIENT)
Dept: MEDICAL GROUP | Facility: MEDICAL CENTER | Age: 57
End: 2022-11-02
Payer: MEDICAID

## 2022-11-02 DIAGNOSIS — Z72.0 TOBACCO USE: ICD-10-CM

## 2022-11-02 RX ORDER — NICOTINE 21 MG/24HR
1 PATCH, TRANSDERMAL 24 HOURS TRANSDERMAL EVERY 24 HOURS
Qty: 28 PATCH | Refills: 0 | Status: SHIPPED | OUTPATIENT
Start: 2022-11-02 | End: 2022-11-09 | Stop reason: SDUPTHER

## 2022-11-02 NOTE — TELEPHONE ENCOUNTER
Refills for both strengths of nicotine patches were sent.  Please inform patient.    Meseret Aguila M.D.

## 2022-11-02 NOTE — TELEPHONE ENCOUNTER
1. Name: David Petty      Call Back Number: [phone]        How would the patient prefer to be contacted with a response: Phone call OK to leave a detailed message    2. Which medication(s) is being requested? Nicotine patches    3. What is the preferred Pharmacy?   [id number] - [place], [place] - [address]  [address]  [place] [address]  Phone: [phone] Fax: [phone]    Patient was informed they may receive a return phone call from our office with any additional questions before processing this request.  Patient stated he has not received this medication and that his pharmacy never got an rx.

## 2022-11-09 ENCOUNTER — OFFICE VISIT (OUTPATIENT)
Dept: MEDICAL GROUP | Facility: MEDICAL CENTER | Age: 57
End: 2022-11-09
Attending: INTERNAL MEDICINE
Payer: MEDICAID

## 2022-11-09 VITALS
BODY MASS INDEX: 25.98 KG/M2 | TEMPERATURE: 97.9 F | SYSTOLIC BLOOD PRESSURE: 122 MMHG | HEIGHT: 73 IN | RESPIRATION RATE: 16 BRPM | HEART RATE: 74 BPM | OXYGEN SATURATION: 97 % | DIASTOLIC BLOOD PRESSURE: 82 MMHG | WEIGHT: 196 LBS

## 2022-11-09 DIAGNOSIS — G89.29 CHRONIC LEFT HIP PAIN: ICD-10-CM

## 2022-11-09 DIAGNOSIS — M16.12 PRIMARY OSTEOARTHRITIS OF LEFT HIP: ICD-10-CM

## 2022-11-09 DIAGNOSIS — M25.552 CHRONIC LEFT HIP PAIN: ICD-10-CM

## 2022-11-09 DIAGNOSIS — J30.2 SEASONAL ALLERGIES: ICD-10-CM

## 2022-11-09 DIAGNOSIS — Z72.0 TOBACCO USE: ICD-10-CM

## 2022-11-09 PROBLEM — H92.02 LEFT EAR PAIN: Status: RESOLVED | Noted: 2022-10-12 | Resolved: 2022-11-09

## 2022-11-09 PROCEDURE — 700111 HCHG RX REV CODE 636 W/ 250 OVERRIDE (IP): Performed by: INTERNAL MEDICINE

## 2022-11-09 PROCEDURE — 99214 OFFICE O/P EST MOD 30 MIN: CPT | Performed by: INTERNAL MEDICINE

## 2022-11-09 PROCEDURE — 96372 THER/PROPH/DIAG INJ SC/IM: CPT | Performed by: INTERNAL MEDICINE

## 2022-11-09 RX ORDER — KETOROLAC TROMETHAMINE 30 MG/ML
30 INJECTION, SOLUTION INTRAMUSCULAR; INTRAVENOUS ONCE
Status: COMPLETED | OUTPATIENT
Start: 2022-11-09 | End: 2022-11-09

## 2022-11-09 RX ORDER — CYCLOBENZAPRINE HCL 10 MG
10 TABLET ORAL 3 TIMES DAILY PRN
Qty: 30 TABLET | Refills: 5 | Status: SHIPPED | OUTPATIENT
Start: 2022-11-09 | End: 2023-08-03 | Stop reason: SDUPTHER

## 2022-11-09 RX ORDER — NICOTINE 21 MG/24HR
1 PATCH, TRANSDERMAL 24 HOURS TRANSDERMAL EVERY 24 HOURS
Qty: 28 PATCH | Refills: 0 | Status: SHIPPED | OUTPATIENT
Start: 2022-11-09

## 2022-11-09 RX ORDER — FLUTICASONE PROPIONATE 50 MCG
1 SPRAY, SUSPENSION (ML) NASAL DAILY
Qty: 16 G | Refills: 11 | Status: SHIPPED | OUTPATIENT
Start: 2022-11-09

## 2022-11-09 RX ADMIN — KETOROLAC TROMETHAMINE 30 MG: 30 INJECTION, SOLUTION INTRAMUSCULAR; INTRAVENOUS at 16:42

## 2022-11-09 RX ADMIN — KETOROLAC TROMETHAMINE 30 MG: 30 INJECTION, SOLUTION INTRAMUSCULAR; INTRAVENOUS at 16:43

## 2022-11-09 ASSESSMENT — FIBROSIS 4 INDEX: FIB4 SCORE: 1.12

## 2022-11-10 NOTE — ASSESSMENT & PLAN NOTE
He complains of continued left hip pain.  We reviewed the results of his x-ray which show severe osteoarthritis.  We discussed that the definitive treatment and management for this is a hip replacement.  I have already placed a referral to orthopedics and I gave him this information today.  He is requesting to have a Toradol injection to help manage the pain.  He is aware that he should not take NSAIDs today or tomorrow because of this.

## 2022-11-10 NOTE — ASSESSMENT & PLAN NOTE
States that he was never able to  his nicotine patches and he is requesting that I resend these prescriptions.

## 2022-11-16 ENCOUNTER — HOSPITAL ENCOUNTER (EMERGENCY)
Facility: MEDICAL CENTER | Age: 57
End: 2022-11-16
Attending: EMERGENCY MEDICINE
Payer: MEDICAID

## 2022-11-16 VITALS
HEART RATE: 65 BPM | SYSTOLIC BLOOD PRESSURE: 121 MMHG | BODY MASS INDEX: 25.04 KG/M2 | WEIGHT: 188.93 LBS | RESPIRATION RATE: 16 BRPM | DIASTOLIC BLOOD PRESSURE: 98 MMHG | TEMPERATURE: 97.6 F | OXYGEN SATURATION: 97 % | HEIGHT: 73 IN

## 2022-11-16 DIAGNOSIS — S03.2XXA TOOTH AVULSION, INITIAL ENCOUNTER: ICD-10-CM

## 2022-11-16 PROCEDURE — 700102 HCHG RX REV CODE 250 W/ 637 OVERRIDE(OP): Performed by: EMERGENCY MEDICINE

## 2022-11-16 PROCEDURE — 99283 EMERGENCY DEPT VISIT LOW MDM: CPT

## 2022-11-16 PROCEDURE — A9270 NON-COVERED ITEM OR SERVICE: HCPCS | Performed by: EMERGENCY MEDICINE

## 2022-11-16 RX ORDER — AMOXICILLIN 500 MG/1
500 CAPSULE ORAL 3 TIMES DAILY
Qty: 21 CAPSULE | Refills: 0 | Status: SHIPPED | OUTPATIENT
Start: 2022-11-16 | End: 2022-11-23

## 2022-11-16 RX ORDER — IBUPROFEN 600 MG/1
600 TABLET ORAL ONCE
Status: COMPLETED | OUTPATIENT
Start: 2022-11-16 | End: 2022-11-16

## 2022-11-16 RX ORDER — ACETAMINOPHEN 500 MG
1000 TABLET ORAL ONCE
Status: COMPLETED | OUTPATIENT
Start: 2022-11-16 | End: 2022-11-16

## 2022-11-16 RX ORDER — CHLORHEXIDINE GLUCONATE ORAL RINSE 1.2 MG/ML
15 SOLUTION DENTAL 2 TIMES DAILY
Qty: 118 ML | Refills: 0 | Status: SHIPPED | OUTPATIENT
Start: 2022-11-16 | End: 2023-11-09

## 2022-11-16 RX ADMIN — IBUPROFEN 600 MG: 600 TABLET ORAL at 18:33

## 2022-11-16 RX ADMIN — ACETAMINOPHEN 1000 MG: 500 TABLET ORAL at 18:33

## 2022-11-16 ASSESSMENT — FIBROSIS 4 INDEX: FIB4 SCORE: 1.12
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
## 2022-11-17 NOTE — ED NOTES
PO med's given per order for his c/o pain  Cleared for d/c  dischg instructions given to pt  Verbally understands  D/c'ed to home in NAD  Rx's amoxicillin and peridex given  Pt aware he may take to any pharmacy to fill
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
## 2022-11-17 NOTE — ED NOTES
"MD at  for evaluation  Noted pt having very poor dental care  Holding \"tooth that just fell out\" in a baggy  Missing spot R front tooth area  No bleeding noted    "

## 2022-12-05 ENCOUNTER — TELEPHONE (OUTPATIENT)
Dept: PHYSICAL THERAPY | Facility: MEDICAL CENTER | Age: 57
End: 2022-12-05
Payer: MEDICAID

## 2022-12-05 NOTE — OP THERAPY DISCHARGE SUMMARY
Outpatient Physical Therapy  DISCHARGE SUMMARY NOTE      [facility] Outpatient Physical Therapy  [address]  [place] [address]  Phone:  [phone]  Fax:  [phone]    Date of Visit: 12/05/2022    Patient: David Petty  YOB: 1965  MRN: [id number]     Referring Provider: No referring provider defined for this encounter.   Referring Diagnosis No admission diagnoses are documented for this encounter.         Functional Assessment Used        Your patient is being discharged from Physical Therapy with the following comments:   Patient cancelled or missed more than 2 scheduled appointments/non-compliant    Comments:  Pt was only seen for initial evaluation on  10/21/22. He was a no show to two of his follow up appointments. Attempted contact with no return phone call from patient.     Limitations Remaining:  Please see initial evaluation. Current limitations unknown to provider.     Recommendations:  Pt has not been seen since initial evaluation >30 days. Pt has failed to schedule additional follow ups. Per policy, pt will need to be seen by PCP or acquire another referral prior to initiating skilled physical therapy. Pt is being discharged at this time.      Robbie Garrett, PT    Date: 12/5/2022

## 2022-12-12 ENCOUNTER — APPOINTMENT (OUTPATIENT)
Dept: PHYSICAL THERAPY | Facility: MEDICAL CENTER | Age: 57
End: 2022-12-12
Attending: INTERNAL MEDICINE
Payer: MEDICAID

## 2022-12-19 ENCOUNTER — APPOINTMENT (OUTPATIENT)
Dept: PHYSICAL THERAPY | Facility: MEDICAL CENTER | Age: 57
End: 2022-12-19
Attending: INTERNAL MEDICINE
Payer: MEDICAID

## 2023-01-06 ENCOUNTER — APPOINTMENT (OUTPATIENT)
Dept: PHYSICAL THERAPY | Facility: MEDICAL CENTER | Age: 58
End: 2023-01-06
Attending: INTERNAL MEDICINE
Payer: MEDICAID

## 2023-01-09 ENCOUNTER — APPOINTMENT (OUTPATIENT)
Dept: PHYSICAL THERAPY | Facility: MEDICAL CENTER | Age: 58
End: 2023-01-09
Attending: INTERNAL MEDICINE
Payer: MEDICAID

## 2023-01-16 ENCOUNTER — APPOINTMENT (OUTPATIENT)
Dept: PHYSICAL THERAPY | Facility: MEDICAL CENTER | Age: 58
End: 2023-01-16
Attending: INTERNAL MEDICINE
Payer: MEDICAID

## 2023-08-03 ENCOUNTER — OFFICE VISIT (OUTPATIENT)
Dept: MEDICAL GROUP | Facility: MEDICAL CENTER | Age: 58
End: 2023-08-03
Attending: FAMILY MEDICINE
Payer: MEDICAID

## 2023-08-03 VITALS
OXYGEN SATURATION: 94 % | RESPIRATION RATE: 16 BRPM | TEMPERATURE: 97.5 F | DIASTOLIC BLOOD PRESSURE: 68 MMHG | HEIGHT: 73 IN | SYSTOLIC BLOOD PRESSURE: 94 MMHG | BODY MASS INDEX: 23.35 KG/M2 | HEART RATE: 69 BPM | WEIGHT: 176.2 LBS

## 2023-08-03 DIAGNOSIS — Z80.42 FAMILY HISTORY OF MALIGNANT NEOPLASM OF PROSTATE IN FATHER: ICD-10-CM

## 2023-08-03 DIAGNOSIS — Z12.11 SCREENING FOR COLORECTAL CANCER: ICD-10-CM

## 2023-08-03 DIAGNOSIS — M25.552 CHRONIC LEFT HIP PAIN: ICD-10-CM

## 2023-08-03 DIAGNOSIS — Z12.12 SCREENING FOR COLORECTAL CANCER: ICD-10-CM

## 2023-08-03 DIAGNOSIS — G89.29 CHRONIC LEFT HIP PAIN: ICD-10-CM

## 2023-08-03 DIAGNOSIS — F20.9 SCHIZOPHRENIA, UNSPECIFIED TYPE (HCC): ICD-10-CM

## 2023-08-03 PROCEDURE — 99212 OFFICE O/P EST SF 10 MIN: CPT | Mod: 25 | Performed by: FAMILY MEDICINE

## 2023-08-03 PROCEDURE — 3078F DIAST BP <80 MM HG: CPT | Performed by: FAMILY MEDICINE

## 2023-08-03 PROCEDURE — 99214 OFFICE O/P EST MOD 30 MIN: CPT | Performed by: FAMILY MEDICINE

## 2023-08-03 PROCEDURE — 96372 THER/PROPH/DIAG INJ SC/IM: CPT | Performed by: FAMILY MEDICINE

## 2023-08-03 PROCEDURE — 3074F SYST BP LT 130 MM HG: CPT | Performed by: FAMILY MEDICINE

## 2023-08-03 PROCEDURE — 700111 HCHG RX REV CODE 636 W/ 250 OVERRIDE (IP): Mod: JZ | Performed by: FAMILY MEDICINE

## 2023-08-03 RX ORDER — CYCLOBENZAPRINE HCL 10 MG
10 TABLET ORAL 3 TIMES DAILY PRN
Qty: 30 TABLET | Refills: 5 | Status: SHIPPED | OUTPATIENT
Start: 2023-08-03

## 2023-08-03 RX ORDER — LURASIDONE HYDROCHLORIDE 20 MG/1
20 TABLET, FILM COATED ORAL
Qty: 90 TABLET | Refills: 1 | Status: SHIPPED | OUTPATIENT
Start: 2023-08-03 | End: 2024-01-26

## 2023-08-03 RX ORDER — MELOXICAM 15 MG/1
15 TABLET ORAL
Qty: 90 TABLET | Refills: 1 | Status: SHIPPED | OUTPATIENT
Start: 2023-08-03 | End: 2024-01-26 | Stop reason: SDUPTHER

## 2023-08-03 RX ORDER — KETOROLAC TROMETHAMINE 30 MG/ML
30 INJECTION, SOLUTION INTRAMUSCULAR; INTRAVENOUS ONCE
Status: COMPLETED | OUTPATIENT
Start: 2023-08-03 | End: 2023-08-03

## 2023-08-03 RX ADMIN — KETOROLAC TROMETHAMINE 30 MG: 30 INJECTION, SOLUTION INTRAMUSCULAR at 15:51

## 2023-08-03 NOTE — ASSESSMENT & PLAN NOTE
Reports chronic pain is relieved with routine Toradol injections. Presenting today for repeat injection. He also takes Mobic and flexeril to help with symptoms. He is aware not to mix NSAIDS.

## 2023-08-03 NOTE — PROGRESS NOTES
"Subjective   Chief Complaint   Patient presents with    Arthritis     Causing pain in hips to leg.    Medication Refill     Cortizone shot.          HPI:   David presents today with    Chronic left hip pain  Reports chronic pain is relieved with routine Toradol injections. Presenting today for repeat injection. He also takes Mobic and flexeril to help with symptoms. He is aware not to mix NSAIDS.       Objective   Social History     Tobacco Use    Smoking status: Every Day     Years: 41.00     Types: Cigarettes    Smokeless tobacco: Never    Tobacco comments:     One cig/day, previous 1 ppd   Vaping Use    Vaping Use: Never used   Substance Use Topics    Alcohol use: Not Currently     Comment: history of alcoholism, was drinking 18 beers daily until June 2022, started drinking as teenager    Drug use: Not Currently     Types: Marijuana     Comment: h/o MJ, quit June 2022       Exam:  BP 94/68 (BP Location: Left arm, Patient Position: Sitting, BP Cuff Size: Adult)   Pulse 69   Temp 36.4 °C (97.5 °F) (Temporal)   Resp 16   Ht 1.854 m (6' 1\")   Wt 79.9 kg (176 lb 3.2 oz)   SpO2 94%   BMI 23.25 kg/m²     Physical Exam  Constitutional: Alert, no distress  Skin: No rashes in visible areas  Eye: Conjunctiva clear, lids normal  Respiratory: Unlabored respiratory effort, no cough  MSK: Normal gait, moves all extremities  Psych: Alert and oriented x3, normal affect and mood    No Known Allergies    [facility]/pharmacy #[id number] - [place], [place] - [address]  [address]  [facility] [address]  Phone: [phone] Fax: [phone]    [facility] - [address] [address]  Phone: [phone] Fax: [phone]    Current Outpatient Medications   Medication Sig Dispense Refill    cyclobenzaprine (FLEXERIL) 10 mg Tab Take 1 Tablet by mouth 3 times a day as needed for Muscle Spasms. 30 Tablet 5    lurasidone (LATUDA) 20 MG Tab Take 1 Tablet by mouth with dinner. 90 Tablet 1    meloxicam (MOBIC) 15 MG tablet Take " 1 Tablet by mouth 1 time a day as needed for Severe Pain. 90 Tablet 1    ketorolac (TORADOL) 30 MG/ML Solution       chlorhexidine (PERIDEX) 0.12 % Solution Take 15 mL by mouth 2 times a day. 118 mL 0    nicotine (NICODERM) 14 MG/24HR PATCH 24 HR Place 1 Patch on the skin every 24 hours. 28 Patch 0    nicotine (NICODERM) 7 MG/24HR PATCH 24 HR Place 1 Patch on the skin every 24 hours. 28 Patch 0    fluticasone (FLONASE) 50 MCG/ACT nasal spray Administer 1 Spray into affected nostril(S) every day. 16 g 11    atorvastatin (LIPITOR) 20 MG Tab Take 1 Tablet by mouth every evening. 30 Tablet 5    fexofenadine (ALLEGRA) 180 MG tablet Take 1 Tablet by mouth every day. 30 Tablet 11    Cholecalciferol (VITAMIN D-3) 25 MCG (1000 UT) Cap Take 25 mcg by mouth every day. 60 capsule 5     No current facility-administered medications for this visit.       Assessment & Plan    58 y.o. male with the following -     1. Chronic left hip pain  - Chronic; clinically stable. Refill sent for:  - cyclobenzaprine (FLEXERIL) 10 mg Tab; Take 1 Tablet by mouth 3 times a day as needed for Muscle Spasms.  Dispense: 30 Tablet; Refill: 5  - meloxicam (MOBIC) 15 MG tablet; Take 1 Tablet by mouth 1 time a day as needed for Severe Pain.  Dispense: 90 Tablet; Refill: 1  - ketorolac (Toradol) injection 30 mg    2. Schizophrenia, unspecified type (HCC)  - Chronic; clinically stable. Refill sent for:  - lurasidone (LATUDA) 20 MG Tab; Take 1 Tablet by mouth with dinner.  Dispense: 90 Tablet; Refill: 1    3. Screening for colorectal cancer  - Referral to GI for Colonoscopy; no prior colon cancer screening done    4. Family history of malignant neoplasm of prostate in father  - PROSTATE SPECIFIC AG SCREENING; Future    Return if symptoms worsen or fail to improve.    Please note that this dictation was created using voice recognition software. I have made every reasonable attempt to correct obvious errors, but I expect that there are errors of grammar and  possibly content that I did not discover before finalizing the note.

## 2023-11-09 ENCOUNTER — OFFICE VISIT (OUTPATIENT)
Dept: MEDICAL GROUP | Facility: MEDICAL CENTER | Age: 58
End: 2023-11-09
Attending: INTERNAL MEDICINE
Payer: MEDICAID

## 2023-11-09 VITALS
TEMPERATURE: 97.3 F | DIASTOLIC BLOOD PRESSURE: 78 MMHG | BODY MASS INDEX: 22.93 KG/M2 | OXYGEN SATURATION: 97 % | RESPIRATION RATE: 16 BRPM | WEIGHT: 173 LBS | HEART RATE: 90 BPM | HEIGHT: 73 IN | SYSTOLIC BLOOD PRESSURE: 118 MMHG

## 2023-11-09 DIAGNOSIS — E78.5 DYSLIPIDEMIA: ICD-10-CM

## 2023-11-09 DIAGNOSIS — G89.29 CHRONIC LEFT HIP PAIN: ICD-10-CM

## 2023-11-09 DIAGNOSIS — Z11.4 SCREENING FOR HIV (HUMAN IMMUNODEFICIENCY VIRUS): ICD-10-CM

## 2023-11-09 DIAGNOSIS — Z13.1 SCREENING FOR DIABETES MELLITUS: ICD-10-CM

## 2023-11-09 DIAGNOSIS — M25.552 CHRONIC LEFT HIP PAIN: ICD-10-CM

## 2023-11-09 DIAGNOSIS — E55.9 VITAMIN D DEFICIENCY: ICD-10-CM

## 2023-11-09 DIAGNOSIS — K08.109 EDENTULOUS: ICD-10-CM

## 2023-11-09 PROCEDURE — 3074F SYST BP LT 130 MM HG: CPT | Performed by: INTERNAL MEDICINE

## 2023-11-09 PROCEDURE — 96372 THER/PROPH/DIAG INJ SC/IM: CPT | Performed by: INTERNAL MEDICINE

## 2023-11-09 PROCEDURE — 99213 OFFICE O/P EST LOW 20 MIN: CPT | Mod: 25 | Performed by: INTERNAL MEDICINE

## 2023-11-09 PROCEDURE — 700111 HCHG RX REV CODE 636 W/ 250 OVERRIDE (IP): Mod: JZ,UD | Performed by: INTERNAL MEDICINE

## 2023-11-09 PROCEDURE — 99214 OFFICE O/P EST MOD 30 MIN: CPT | Performed by: INTERNAL MEDICINE

## 2023-11-09 PROCEDURE — 3078F DIAST BP <80 MM HG: CPT | Performed by: INTERNAL MEDICINE

## 2023-11-09 RX ORDER — KETOROLAC TROMETHAMINE 30 MG/ML
30 INJECTION, SOLUTION INTRAMUSCULAR; INTRAVENOUS ONCE
Status: COMPLETED | OUTPATIENT
Start: 2023-11-09 | End: 2023-11-09

## 2023-11-09 RX ORDER — ACETAMINOPHEN AND CODEINE PHOSPHATE 300; 30 MG/1; MG/1
TABLET ORAL
COMMUNITY
Start: 2023-10-17 | End: 2023-11-09

## 2023-11-09 RX ORDER — AMOXICILLIN 500 MG/1
1 TABLET, FILM COATED ORAL EVERY 8 HOURS
COMMUNITY
Start: 2023-10-23 | End: 2023-11-09

## 2023-11-09 RX ORDER — IBUPROFEN 800 MG/1
800 TABLET ORAL EVERY 6 HOURS
COMMUNITY
Start: 2023-10-17 | End: 2023-11-09

## 2023-11-09 RX ORDER — CHOLECALCIFEROL (VITAMIN D3) 25 MCG
25 CAPSULE ORAL DAILY
Qty: 30 CAPSULE | Refills: 5 | Status: SHIPPED | OUTPATIENT
Start: 2023-11-09 | End: 2024-01-26

## 2023-11-09 RX ORDER — LURASIDONE HYDROCHLORIDE 40 MG/1
TABLET, FILM COATED ORAL
COMMUNITY
Start: 2023-10-23 | End: 2023-11-09

## 2023-11-09 RX ADMIN — KETOROLAC TROMETHAMINE 30 MG: 30 INJECTION, SOLUTION INTRAMUSCULAR; INTRAVENOUS at 11:55

## 2023-11-09 RX ADMIN — KETOROLAC TROMETHAMINE 30 MG: 30 INJECTION, SOLUTION INTRAMUSCULAR; INTRAVENOUS at 11:51

## 2023-11-09 NOTE — PROGRESS NOTES
Subjective:   David Petty is a 58 y.o. male here today for toradol shot, discuss dentures, labs    Chronic left hip pain  Continues on meloxicam which he takes most days as well as Flexeril as needed.  He has decided not to proceed with hip replacement surgery despite his severe disease.  He would like to get a Toradol shot today.  He has not taken any meloxicam yet.    Edentulous  Recently had a full extraction of teeth and dentures made.  Reports that he still getting some pain in his gums as he adjusts to the dentures but he denies any ulcerations or wounds.    Dyslipidemia  The 10-year ASCVD risk score (Janette MALHOTRA, et al., 2019) is: 14.9%  He is not currently taking atorvastatin although it has been prescribed    Vitamin D deficiency  Was previously taking supplemental vitamin D 1000 units daily but reports running out recently.  He is requesting a refill.  He is due for updated labs.       Current medicines (including changes today)  Current Outpatient Medications   Medication Sig Dispense Refill    Cholecalciferol (VITAMIN D-3) 25 MCG (1000 UT) Cap Take 25 mcg by mouth every day. 30 Capsule 5    cyclobenzaprine (FLEXERIL) 10 mg Tab Take 1 Tablet by mouth 3 times a day as needed for Muscle Spasms. 30 Tablet 5    lurasidone (LATUDA) 20 MG Tab Take 1 Tablet by mouth with dinner. 90 Tablet 1    meloxicam (MOBIC) 15 MG tablet Take 1 Tablet by mouth 1 time a day as needed for Severe Pain. 90 Tablet 1    nicotine (NICODERM) 14 MG/24HR PATCH 24 HR Place 1 Patch on the skin every 24 hours. 28 Patch 0    nicotine (NICODERM) 7 MG/24HR PATCH 24 HR Place 1 Patch on the skin every 24 hours. 28 Patch 0    fluticasone (FLONASE) 50 MCG/ACT nasal spray Administer 1 Spray into affected nostril(S) every day. 16 g 11    fexofenadine (ALLEGRA) 180 MG tablet Take 1 Tablet by mouth every day. 30 Tablet 11     No current facility-administered medications for this visit.     He  has a past medical history of Allergy, Anxiety,  Arthralgia of left hip, Depression, Hyperlipidemia, and Schizophrenia (HCC).         Objective:     Vitals:    11/09/23 1031   BP: 118/78   Pulse: 90   Resp: 16   Temp: 36.3 °C (97.3 °F)   SpO2: 97%     Body mass index is 22.83 kg/m².   Physical Exam:  Constitutional: Alert, no distress.  Skin: Warm, dry, good turgor, no rashes in visible areas.  Eye: Equal, round and reactive, conjunctiva clear, lids normal.  Psych: Alert and oriented x3, normal affect and mood.      Assessment and Plan:   The following treatment plan was discussed    1. Chronic left hip pain  Toradol injection given in clinic today.  He is aware that he can resume his meloxicam tomorrow.  Desires to hold off on any surgical intervention at this time.  - ketorolac (Toradol) injection 30 mg  - ketorolac (Toradol) injection 30 mg    2. Vitamin D deficiency  - VITAMIN D,25 HYDROXY (DEFICIENCY); Future  - Cholecalciferol (VITAMIN D-3) 25 MCG (1000 UT) Cap; Take 25 mcg by mouth every day.  Dispense: 30 Capsule; Refill: 5    3. Dyslipidemia  Will likely benefit from statin therapy however will obtain updated labs  - Lipid Profile; Future    4. Screening for diabetes mellitus  - HEMOGLOBIN A1C; Future    5. Screening for HIV (human immunodeficiency virus)  - HIV AG/AB COMBO ASSAY SCREENING; Future    6. Edentulous  Discussed monitoring for any open sores or wounds.  Otherwise, will follow-up with his dentist.        Followup: Return if symptoms worsen or fail to improve.

## 2023-11-09 NOTE — ASSESSMENT & PLAN NOTE
Recently had a full extraction of teeth and dentures made.  Reports that he still getting some pain in his gums as he adjusts to the dentures but he denies any ulcerations or wounds.

## 2023-11-09 NOTE — ASSESSMENT & PLAN NOTE
Was previously taking supplemental vitamin D 1000 units daily but reports running out recently.  He is requesting a refill.  He is due for updated labs.

## 2023-11-09 NOTE — ASSESSMENT & PLAN NOTE
The 10-year ASCVD risk score (Janette MALHOTRA, et al., 2019) is: 14.9%  He is not currently taking atorvastatin although it has been prescribed

## 2023-11-09 NOTE — ASSESSMENT & PLAN NOTE
Continues on meloxicam which he takes most days as well as Flexeril as needed.  He has decided not to proceed with hip replacement surgery despite his severe disease.  He would like to get a Toradol shot today.  He has not taken any meloxicam yet.

## 2024-01-03 ENCOUNTER — APPOINTMENT (OUTPATIENT)
Dept: RADIOLOGY | Facility: MEDICAL CENTER | Age: 59
End: 2024-01-03
Attending: STUDENT IN AN ORGANIZED HEALTH CARE EDUCATION/TRAINING PROGRAM
Payer: MEDICAID

## 2024-01-03 ENCOUNTER — HOSPITAL ENCOUNTER (EMERGENCY)
Facility: MEDICAL CENTER | Age: 59
End: 2024-01-03
Attending: STUDENT IN AN ORGANIZED HEALTH CARE EDUCATION/TRAINING PROGRAM
Payer: MEDICAID

## 2024-01-03 VITALS
BODY MASS INDEX: 23.7 KG/M2 | DIASTOLIC BLOOD PRESSURE: 77 MMHG | RESPIRATION RATE: 14 BRPM | HEART RATE: 73 BPM | SYSTOLIC BLOOD PRESSURE: 110 MMHG | OXYGEN SATURATION: 95 % | HEIGHT: 72 IN | TEMPERATURE: 99.5 F | WEIGHT: 175 LBS

## 2024-01-03 DIAGNOSIS — J10.1 INFLUENZA A: ICD-10-CM

## 2024-01-03 DIAGNOSIS — F11.920 OPIOID INTOXICATION WITHOUT COMPLICATION (HCC): ICD-10-CM

## 2024-01-03 LAB
ALBUMIN SERPL BCP-MCNC: 3.5 G/DL (ref 3.2–4.9)
ALBUMIN/GLOB SERPL: 1.4 G/DL
ALP SERPL-CCNC: 38 U/L (ref 30–99)
ALT SERPL-CCNC: 13 U/L (ref 2–50)
ANION GAP SERPL CALC-SCNC: 11 MMOL/L (ref 7–16)
APAP SERPL-MCNC: 8 UG/ML (ref 10–30)
AST SERPL-CCNC: 16 U/L (ref 12–45)
BASOPHILS # BLD AUTO: 0.4 % (ref 0–1.8)
BASOPHILS # BLD: 0.02 K/UL (ref 0–0.12)
BILIRUB SERPL-MCNC: 0.4 MG/DL (ref 0.1–1.5)
BUN SERPL-MCNC: 7 MG/DL (ref 8–22)
CALCIUM ALBUM COR SERPL-MCNC: 8.8 MG/DL (ref 8.5–10.5)
CALCIUM SERPL-MCNC: 8.4 MG/DL (ref 8.4–10.2)
CHLORIDE SERPL-SCNC: 104 MMOL/L (ref 96–112)
CO2 SERPL-SCNC: 20 MMOL/L (ref 20–33)
CREAT SERPL-MCNC: 0.89 MG/DL (ref 0.5–1.4)
EKG IMPRESSION: NORMAL
EOSINOPHIL # BLD AUTO: 0.08 K/UL (ref 0–0.51)
EOSINOPHIL NFR BLD: 1.4 % (ref 0–6.9)
ERYTHROCYTE [DISTWIDTH] IN BLOOD BY AUTOMATED COUNT: 45.1 FL (ref 35.9–50)
ETHANOL BLD-MCNC: <10.1 MG/DL
FLUAV RNA SPEC QL NAA+PROBE: POSITIVE
FLUBV RNA SPEC QL NAA+PROBE: NEGATIVE
GFR SERPLBLD CREATININE-BSD FMLA CKD-EPI: 99 ML/MIN/1.73 M 2
GLOBULIN SER CALC-MCNC: 2.5 G/DL (ref 1.9–3.5)
GLUCOSE SERPL-MCNC: 96 MG/DL (ref 65–99)
HCT VFR BLD AUTO: 46.8 % (ref 42–52)
HGB BLD-MCNC: 16.3 G/DL (ref 14–18)
IMM GRANULOCYTES # BLD AUTO: 0.02 K/UL (ref 0–0.11)
IMM GRANULOCYTES NFR BLD AUTO: 0.4 % (ref 0–0.9)
LACTATE SERPL-SCNC: 1.1 MMOL/L (ref 0.5–2)
LYMPHOCYTES # BLD AUTO: 0.65 K/UL (ref 1–4.8)
LYMPHOCYTES NFR BLD: 11.4 % (ref 22–41)
MCH RBC QN AUTO: 33.8 PG (ref 27–33)
MCHC RBC AUTO-ENTMCNC: 34.8 G/DL (ref 32.3–36.5)
MCV RBC AUTO: 97.1 FL (ref 81.4–97.8)
MONOCYTES # BLD AUTO: 0.88 K/UL (ref 0–0.85)
MONOCYTES NFR BLD AUTO: 15.4 % (ref 0–13.4)
NEUTROPHILS # BLD AUTO: 4.06 K/UL (ref 1.82–7.42)
NEUTROPHILS NFR BLD: 71 % (ref 44–72)
NRBC # BLD AUTO: 0 K/UL
NRBC BLD-RTO: 0 /100 WBC (ref 0–0.2)
PLATELET # BLD AUTO: 162 K/UL (ref 164–446)
PMV BLD AUTO: 10.6 FL (ref 9–12.9)
POTASSIUM SERPL-SCNC: 3.6 MMOL/L (ref 3.6–5.5)
PROT SERPL-MCNC: 6 G/DL (ref 6–8.2)
RBC # BLD AUTO: 4.82 M/UL (ref 4.7–6.1)
RSV RNA SPEC QL NAA+PROBE: NEGATIVE
SALICYLATES SERPL-MCNC: <1 MG/DL (ref 15–25)
SARS-COV-2 RNA RESP QL NAA+PROBE: NOTDETECTED
SODIUM SERPL-SCNC: 135 MMOL/L (ref 135–145)
SPECIMEN SOURCE: ABNORMAL
TROPONIN T SERPL-MCNC: <6 NG/L (ref 6–19)
WBC # BLD AUTO: 5.7 K/UL (ref 4.8–10.8)

## 2024-01-03 PROCEDURE — 99285 EMERGENCY DEPT VISIT HI MDM: CPT

## 2024-01-03 PROCEDURE — 82077 ASSAY SPEC XCP UR&BREATH IA: CPT

## 2024-01-03 PROCEDURE — 71045 X-RAY EXAM CHEST 1 VIEW: CPT

## 2024-01-03 PROCEDURE — 80143 DRUG ASSAY ACETAMINOPHEN: CPT

## 2024-01-03 PROCEDURE — 80179 DRUG ASSAY SALICYLATE: CPT

## 2024-01-03 PROCEDURE — 83605 ASSAY OF LACTIC ACID: CPT

## 2024-01-03 PROCEDURE — 85025 COMPLETE CBC W/AUTO DIFF WBC: CPT

## 2024-01-03 PROCEDURE — 80053 COMPREHEN METABOLIC PANEL: CPT

## 2024-01-03 PROCEDURE — 84484 ASSAY OF TROPONIN QUANT: CPT

## 2024-01-03 PROCEDURE — 93005 ELECTROCARDIOGRAM TRACING: CPT | Performed by: STUDENT IN AN ORGANIZED HEALTH CARE EDUCATION/TRAINING PROGRAM

## 2024-01-03 PROCEDURE — 70450 CT HEAD/BRAIN W/O DYE: CPT

## 2024-01-03 PROCEDURE — 96375 TX/PRO/DX INJ NEW DRUG ADDON: CPT

## 2024-01-03 PROCEDURE — 0241U HCHG SARS-COV-2 COVID-19 NFCT DS RESP RNA 4 TRGT MIC: CPT

## 2024-01-03 PROCEDURE — 700111 HCHG RX REV CODE 636 W/ 250 OVERRIDE (IP): Performed by: STUDENT IN AN ORGANIZED HEALTH CARE EDUCATION/TRAINING PROGRAM

## 2024-01-03 PROCEDURE — 700111 HCHG RX REV CODE 636 W/ 250 OVERRIDE (IP): Mod: JZ

## 2024-01-03 PROCEDURE — 96374 THER/PROPH/DIAG INJ IV PUSH: CPT

## 2024-01-03 PROCEDURE — 36415 COLL VENOUS BLD VENIPUNCTURE: CPT

## 2024-01-03 RX ORDER — KETOROLAC TROMETHAMINE 30 MG/ML
15 INJECTION, SOLUTION INTRAMUSCULAR; INTRAVENOUS ONCE
Status: COMPLETED | OUTPATIENT
Start: 2024-01-03 | End: 2024-01-03

## 2024-01-03 RX ORDER — KETOROLAC TROMETHAMINE 30 MG/ML
INJECTION, SOLUTION INTRAMUSCULAR; INTRAVENOUS
Status: COMPLETED
Start: 2024-01-03 | End: 2024-01-03

## 2024-01-03 RX ORDER — NALOXONE HYDROCHLORIDE 0.4 MG/ML
0.4 INJECTION, SOLUTION INTRAMUSCULAR; INTRAVENOUS; SUBCUTANEOUS ONCE
Status: COMPLETED | OUTPATIENT
Start: 2024-01-03 | End: 2024-01-03

## 2024-01-03 RX ADMIN — KETOROLAC TROMETHAMINE 15 MG: 30 INJECTION, SOLUTION INTRAMUSCULAR; INTRAVENOUS at 19:45

## 2024-01-03 RX ADMIN — NALOXONE HYDROCHLORIDE 0.4 MG: 0.4 INJECTION, SOLUTION INTRAMUSCULAR; INTRAVENOUS; SUBCUTANEOUS at 17:42

## 2024-01-04 ENCOUNTER — TELEPHONE (OUTPATIENT)
Dept: MEDICAL GROUP | Facility: MEDICAL CENTER | Age: 59
End: 2024-01-04
Payer: MEDICAID

## 2024-01-04 DIAGNOSIS — J10.1 INFLUENZA A: ICD-10-CM

## 2024-01-04 RX ORDER — OSELTAMIVIR PHOSPHATE 75 MG/1
75 CAPSULE ORAL 2 TIMES DAILY
Qty: 10 CAPSULE | Refills: 0 | Status: SHIPPED | OUTPATIENT
Start: 2024-01-04 | End: 2024-01-26

## 2024-01-04 NOTE — DISCHARGE INSTRUCTIONS
You likely had a narcotic ingestion.  As we discussed you should be careful about where you obtain your drugs.  During evaluation you are found to have flu.  You can use ibuprofen every 6 hours as needed for body aches.  You should be sure to stay hydrated.  If you have difficulty breathing uncontrolled vomiting you should return to the emergency department.

## 2024-01-04 NOTE — ED PROVIDER NOTES
ED Provider Note    CHIEF COMPLAINT  Chief Complaint   Patient presents with    ALOC     Pt found to be altered and barely awake per roommate. Pt is oriented to year and time.     Headache     Pt c/o HA with head foggyness and chest pain as well       EXTERNAL RECORDS REVIEWED  Outpatient Notes internal medicine office visit from 11/9/2023 patient was evaluated for chronic left hip pain continued on meloxicam, dyslipidemia and vitamin D deficiency patient also noted to have history of schizophrenia    HPI/ROS  LIMITATION TO HISTORY   Select: Altered mental status / Confusion  OUTSIDE HISTORIAN(S):  Friend patient's roommate ports that he had frequent cough for the past 2 weeks.      David Petty is a 58 y.o. male who presents with altered mental status.  Patient's roommate says over the past 3 weeks he has had frequent cough.  Today while in the car patient's roommate notes that patient became less responsive, was shivering and EMS was called.  EMS reported normal blood glucose and revealed minimally responsive small pupils.  No other reported recent illness or trauma.  No reported drug or alcohol use.    PAST MEDICAL HISTORY   has a past medical history of Allergy, Anxiety, Arthralgia of left hip, Depression, Hyperlipidemia, and Schizophrenia (HCC).    SURGICAL HISTORY   has a past surgical history that includes appendectomy; arthroplasty; and other abdominal surgery.    FAMILY HISTORY  Family History   Problem Relation Age of Onset    Diabetes Maternal Grandmother     Cancer Neg Hx     Lung Disease Neg Hx     Heart Disease Neg Hx     Hypertension Neg Hx     Hyperlipidemia Neg Hx     Stroke Neg Hx        SOCIAL HISTORY  Social History     Tobacco Use    Smoking status: Every Day     Types: Cigarettes    Smokeless tobacco: Never    Tobacco comments:     One cig/day, previous 1 ppd   Vaping Use    Vaping Use: Never used   Substance and Sexual Activity    Alcohol use: Not Currently     Comment: history of  alcoholism, was drinking 18 beers daily until June 2022, started drinking as teenager    Drug use: Not Currently     Types: Marijuana     Comment: h/o MJ, quit June 2022    Sexual activity: Not on file       CURRENT MEDICATIONS  Home Medications       Reviewed by Mikaela Diallo R.N. (Registered Nurse) on 01/03/24 at 1732  Med List Status: Partial     Medication Last Dose Status   Cholecalciferol (VITAMIN D-3) 25 MCG (1000 UT) Cap  Active   cyclobenzaprine (FLEXERIL) 10 mg Tab  Active   fexofenadine (ALLEGRA) 180 MG tablet  Active   fluticasone (FLONASE) 50 MCG/ACT nasal spray  Active   lurasidone (LATUDA) 20 MG Tab  Active   meloxicam (MOBIC) 15 MG tablet  Active   nicotine (NICODERM) 14 MG/24HR PATCH 24 HR  Active   nicotine (NICODERM) 7 MG/24HR PATCH 24 HR  Active                    ALLERGIES  No Known Allergies    PHYSICAL EXAM  VITAL SIGNS: /77   Pulse 73   Temp 37.5 °C (99.5 °F) (Temporal)   Resp 14   Ht 1.829 m (6')   Wt 79.4 kg (175 lb)   SpO2 95%   BMI 23.73 kg/m²    Constitutional: Ill-appearing  HENT: No signs of trauma, Bilateral external ears normal, Nose normal.   Eyes: Pupils are equal and reactive miotic, Conjunctiva normal, Non-icteric.   Neck: Normal range of motion, No tenderness, Supple, No stridor.   Cardiovascular: Regular rate and rhythm, no murmurs.   Thorax & Lungs: Normal breath sounds, No respiratory distress, No wheezing, No chest tenderness.   Abdomen: Bowel sounds normal, Soft, No tenderness, No masses, No pulsatile masses.   Skin: Warm, Dry, No erythema, No rash.   Back: No bony tenderness, No CVA tenderness.   Extremities: Intact distal pulses, No edema, No tenderness, No cyanosis  Musculoskeletal: Good range of motion in all major joints. No tenderness to palpation or major deformities noted.   Neurologic: Intermittently awake but somnolent at times minimally responsive to pain.  Moving all 4 extremities no facial injury normal speech when awake      DIAGNOSTIC STUDIES /  PROCEDURES  EKG  I have independently interpreted this EKG  Results for orders placed or performed during the hospital encounter of 24   EKG   Result Value Ref Range    Report       [facility] Emergency Dept[facility]    Test Date:  2024  Pt Name:    SHAWN RUSSELL                   Department: EDS  MRN:        [id number]                      [facility]  Gender:     Male                         Technician: [id number]  :        1965                   Requested By:ER TRIAGE PROTOCOL  Order #:    [id number]                    Reading MD: Chance Romano    Measurements  Intervals                                Axis  Rate:       61                           P:          61  LA:         177                          QRS:        84  QRSD:       83                           T:          45  QT:         384  QTc:        387    Interpretive Statements    Interpreted by me: Sinus rhythm, rate 61, normal intervals, no signs of acute  ischemia, low voltage  Electronically Signed On 2024 17:52:43 PST by Chance Romano           LABS  Labs Reviewed   CBC WITH DIFFERENTIAL - Abnormal; Notable for the following components:       Result Value    MCH 33.8 (*)     Platelet Count 162 (*)     Lymphocytes 11.40 (*)     Monocytes 15.40 (*)     Lymphs (Absolute) 0.65 (*)     Monos (Absolute) 0.88 (*)     All other components within normal limits   COMP METABOLIC PANEL - Abnormal; Notable for the following components:    Bun 7 (*)     All other components within normal limits   ACETAMINOPHEN - Abnormal; Notable for the following components:    Acetaminophen -Tylenol 8.0 (*)     All other components within normal limits    Narrative:     Biotin intake of greater than 5 mg per day may interfere with  troponin levels, causing false low values.   SALICYLATE - Abnormal; Notable for the following components:    Salicylates, Quant. <1.0 (*)     All other components within normal limits    Narrative:      Biotin intake of greater than 5 mg per day may interfere with  troponin levels, causing false low values.   COV-2, FLU A/B, AND RSV BY PCR (QVPN) - Abnormal; Notable for the following components:    Influenza virus A RNA POSITIVE (*)     All other components within normal limits   ETHYL ALCOHOL (BLOOD)   TROPONIN    Narrative:     Biotin intake of greater than 5 mg per day may interfere with  troponin levels, causing false low values.   LACTIC ACID    Narrative:     Biotin intake of greater than 5 mg per day may interfere with  troponin levels, causing false low values.   ESTIMATED GFR   DIAGNOSTIC ALCOHOL   URINALYSIS   URINE DRUG SCREEN   POCT GLUCOSE         RADIOLOGY  I have independently interpreted the diagnostic imaging associated with this visit and am waiting the final reading from the radiologist.   My preliminary interpretation is as follows: No intracranial hemorrhage  Radiologist interpretation:   CT-HEAD W/O   Final Result      Head CT without contrast within normal limits. No evidence of acute cerebral infarction, hemorrhage or mass lesion.         DX-CHEST-PORTABLE (1 VIEW)   Final Result         No acute cardiac or pulmonary abnormality is identified.            COURSE & MEDICAL DECISION MAKING    ED Observation Status?     INITIAL ASSESSMENT, COURSE AND PLAN  Care Narrative: On arrival patient noted to be intermittently somnolent, confused and at times minimally sensitive to painful stimuli with miotic pupils.  Patient with slight depression and respirations but normal oxygenation given dose of naloxone with improvement in alertness but patient continues to be confused.  Patient has nonfocal neurologic exam.  Differential includes intracranial hemorrhage, mass, CVA, metabolic encephalopathy, toxidrome.  EKG without clear signs of ischemia.  Normal blood glucose.    On second reassessment patient with normal mentation interacting normally with friends.  Patient has no specific complaints at  this time is other than mild headache and cough.  Patient again denies drug use other than smoking marijuana which he buys off of the street.  Given patient's earlier presentation with significant change in mentation, pupils and respiratory status after naloxone suspect opioid intoxication.  Patient has tested positive for influenza which is in line with the remainder of his symptoms likely patient does not have any signs of bacterial pneumonia not hypoxemic and appears well-hydrated.  Patient was observed 3 hours post naloxone ministration without recurrent respiratory depression or somnolence.  Patient was able to tolerate p.o. and ambulate without difficulty.  Discussed with patient return precautions, supportive care at home, avoidance of street drugs.  Patient provided with take-home naloxone kit.  Patient comfortable with discharge planning.        ADDITIONAL PROBLEM LIST    DISPOSITION AND DISCUSSIONS    FINAL DIAGNOSIS  1. Opioid intoxication without complication (HCC)    2. Influenza A           Electronically signed by: Chance Romano D.O., 1/3/2024 5:34 PM

## 2024-01-04 NOTE — ED NOTES
Naloxone 4 mg/0.1 mL nasal spray was dispensed to the patient for prevention of potential opioid related overdose per protocol.   Counseling was provided regarding:  - Information relating to the recognition, prevention and responses to opioid-related drug overdoses  - How to safely administer the naloxone nasal spray  - Potential side effects and adverse events related to the naloxone nasal spray  - The importance of seeking immediate emergency medical assistance for a person experiencing an opioid-related drug overdose, even after the administration of naloxone  - The immunity from certain civil or criminal liabilities for seeking medical assistance for a person experiencing an opioid-related overdose    Fentanyl strips dispensed as well    Ravin Witt, PharmD, BCPS, BCCCP

## 2024-01-04 NOTE — ED TRIAGE NOTES
Chief Complaint   Patient presents with    ALOC     Pt found to be altered and barely awake per roommate. Pt is oriented to year and time.     Headache     Pt c/o HA with head foggyness and chest pain as well     Pt BIB EMS from home for above. Pt given 800cc of NS and 1 gram of tylenol for temp of 100.3. Pt was barely responsive to painful stimuli upon arrival of EMS. Pt able to answer questions upon arrival, pts pupils are 2        /67   Pulse 66   Resp 12   Ht 1.829 m (6')   Wt 79.4 kg (175 lb)   SpO2 97%   BMI 23.73 kg/m²

## 2024-01-04 NOTE — ED NOTES
PT cleared for discharge home pt has no further questions or concerns  pt to f/u with pcp 1-2days pt verbalized understanding. pt advised to return to closest ED for any worsenign symptotms  Narcan education provided

## 2024-01-04 NOTE — TELEPHONE ENCOUNTER
Caller Name: Radha  Call Back Number: [phone]    How would the patient prefer to be contacted with a response: Phone call OK to leave a detailed message    Pt significant other called stating that the pt tested positive for influenza A, she is wondering if there is medication that he should take and what to do if his condition worsens.

## 2024-01-04 NOTE — TELEPHONE ENCOUNTER
Caller Name: Radha Ramos  Call Back Number: [phone]    How would the patient prefer to be contacted with a response: Phone call OK to leave a detailed message,    2. What are the patient's symptoms (location & severity)? Flu symptoms, pt significant other called last night to let us know that pt was weak,having flu like symptoms, advised to have pt evaluated by ER. Pt stated she did not think he would go to the ER and that she would keep an eye on him and make an appointment for today. Advised that if pt worsens she needs to 911 to have pt transported to the ER. Pt significant other called today to inform that pt became unresponsive last night and was rushed to the ER by the paramedics. He was tested and found to have influenza A, and was also treated with Narcan for prevention of potetial opiod related overdose. She is concerned for him as he was discharged. Advised to keep a close eye on him, to keep him hydrated and return to the ER is pt worsens or doesn't improve.     3. Is this a new symptom Yes    4. When did it start? A few days ago    5. Action taken per Active Symptom Guide: Emergency Department recommended    6. Patient agrees to recommended action per Active Symptom Escalation Protocol.

## 2024-01-11 ENCOUNTER — APPOINTMENT (OUTPATIENT)
Dept: LAB | Facility: MEDICAL CENTER | Age: 59
End: 2024-01-11
Attending: FAMILY MEDICINE
Payer: MEDICAID

## 2024-01-24 ENCOUNTER — HOSPITAL ENCOUNTER (OUTPATIENT)
Dept: LAB | Facility: MEDICAL CENTER | Age: 59
End: 2024-01-24
Attending: INTERNAL MEDICINE
Payer: MEDICAID

## 2024-01-24 DIAGNOSIS — E55.9 VITAMIN D DEFICIENCY: ICD-10-CM

## 2024-01-24 DIAGNOSIS — E78.5 DYSLIPIDEMIA: ICD-10-CM

## 2024-01-24 DIAGNOSIS — Z13.1 SCREENING FOR DIABETES MELLITUS: ICD-10-CM

## 2024-01-24 DIAGNOSIS — Z11.4 SCREENING FOR HIV (HUMAN IMMUNODEFICIENCY VIRUS): ICD-10-CM

## 2024-01-24 LAB
25(OH)D3 SERPL-MCNC: 11 NG/ML (ref 30–100)
CHOLEST SERPL-MCNC: 236 MG/DL (ref 100–199)
EST. AVERAGE GLUCOSE BLD GHB EST-MCNC: 105 MG/DL
FASTING STATUS PATIENT QL REPORTED: NORMAL
HBA1C MFR BLD: 5.3 % (ref 4–5.6)
HDLC SERPL-MCNC: 51 MG/DL
HIV 1+2 AB+HIV1 P24 AG SERPL QL IA: NORMAL
LDLC SERPL CALC-MCNC: 164 MG/DL
TRIGL SERPL-MCNC: 107 MG/DL (ref 0–149)

## 2024-01-24 PROCEDURE — 83036 HEMOGLOBIN GLYCOSYLATED A1C: CPT

## 2024-01-24 PROCEDURE — 80061 LIPID PANEL: CPT

## 2024-01-24 PROCEDURE — 87389 HIV-1 AG W/HIV-1&-2 AB AG IA: CPT

## 2024-01-24 PROCEDURE — 36415 COLL VENOUS BLD VENIPUNCTURE: CPT

## 2024-01-24 PROCEDURE — 82306 VITAMIN D 25 HYDROXY: CPT

## 2024-01-26 ENCOUNTER — OFFICE VISIT (OUTPATIENT)
Dept: MEDICAL GROUP | Facility: MEDICAL CENTER | Age: 59
End: 2024-01-26
Attending: INTERNAL MEDICINE
Payer: MEDICAID

## 2024-01-26 VITALS
OXYGEN SATURATION: 97 % | RESPIRATION RATE: 16 BRPM | WEIGHT: 164 LBS | HEART RATE: 88 BPM | SYSTOLIC BLOOD PRESSURE: 100 MMHG | DIASTOLIC BLOOD PRESSURE: 60 MMHG | BODY MASS INDEX: 22.21 KG/M2 | TEMPERATURE: 98.1 F | HEIGHT: 72 IN

## 2024-01-26 DIAGNOSIS — E55.9 VITAMIN D DEFICIENCY: ICD-10-CM

## 2024-01-26 DIAGNOSIS — E78.5 DYSLIPIDEMIA: ICD-10-CM

## 2024-01-26 DIAGNOSIS — G89.29 CHRONIC LEFT HIP PAIN: ICD-10-CM

## 2024-01-26 DIAGNOSIS — M25.552 CHRONIC LEFT HIP PAIN: ICD-10-CM

## 2024-01-26 PROCEDURE — 700111 HCHG RX REV CODE 636 W/ 250 OVERRIDE (IP): Mod: JZ,UD | Performed by: INTERNAL MEDICINE

## 2024-01-26 PROCEDURE — 3078F DIAST BP <80 MM HG: CPT | Performed by: INTERNAL MEDICINE

## 2024-01-26 PROCEDURE — 96372 THER/PROPH/DIAG INJ SC/IM: CPT | Performed by: INTERNAL MEDICINE

## 2024-01-26 PROCEDURE — 99214 OFFICE O/P EST MOD 30 MIN: CPT | Performed by: INTERNAL MEDICINE

## 2024-01-26 PROCEDURE — 99213 OFFICE O/P EST LOW 20 MIN: CPT | Performed by: INTERNAL MEDICINE

## 2024-01-26 PROCEDURE — 3074F SYST BP LT 130 MM HG: CPT | Performed by: INTERNAL MEDICINE

## 2024-01-26 RX ORDER — ATORVASTATIN CALCIUM 20 MG/1
20 TABLET, FILM COATED ORAL DAILY
Qty: 30 TABLET | Refills: 11 | Status: SHIPPED | OUTPATIENT
Start: 2024-01-26

## 2024-01-26 RX ORDER — KETOROLAC TROMETHAMINE 30 MG/ML
30 INJECTION, SOLUTION INTRAMUSCULAR; INTRAVENOUS ONCE
Status: COMPLETED | OUTPATIENT
Start: 2024-01-26 | End: 2024-01-26

## 2024-01-26 RX ORDER — LURASIDONE HYDROCHLORIDE 40 MG/1
40 TABLET, FILM COATED ORAL
COMMUNITY
Start: 2024-01-05

## 2024-01-26 RX ORDER — MELOXICAM 15 MG/1
15 TABLET ORAL
Qty: 30 TABLET | Refills: 5 | Status: SHIPPED | OUTPATIENT
Start: 2024-01-26

## 2024-01-26 RX ORDER — ERGOCALCIFEROL 1.25 MG/1
50000 CAPSULE ORAL
Qty: 4 CAPSULE | Refills: 5 | Status: SHIPPED | OUTPATIENT
Start: 2024-01-26

## 2024-01-26 RX ADMIN — KETOROLAC TROMETHAMINE 30 MG: 30 INJECTION, SOLUTION INTRAMUSCULAR; INTRAVENOUS at 16:21

## 2024-01-26 RX ADMIN — KETOROLAC TROMETHAMINE 30 MG: 30 INJECTION, SOLUTION INTRAMUSCULAR; INTRAVENOUS at 16:19

## 2024-01-26 ASSESSMENT — FIBROSIS 4 INDEX: FIB4 SCORE: 1.59

## 2024-01-26 NOTE — ASSESSMENT & PLAN NOTE
The 10-year ASCVD risk score (Janette MALHOTRA, et al., 2019) is: 9.4%  He is not currently on any medication for lipid lowering but amenable to starting.

## 2024-01-27 NOTE — ASSESSMENT & PLAN NOTE
Continues to report hip pain but benefits from Toradol injection.  He is requesting this today.  Also requesting refill on his meloxicam.

## 2024-01-27 NOTE — PROGRESS NOTES
Subjective:   David Petty is a 58 y.o. male here today for lab review    Dyslipidemia  The 10-year ASCVD risk score (Janette MALHOTRA, et al., 2019) is: 9.4%  He is not currently on any medication for lipid lowering but amenable to starting.    Vitamin D deficiency  Reviewed most recent labs which showed low vitamin D at 11.  Not currently on supplementation.    Chronic left hip pain  Continues to report hip pain but benefits from Toradol injection.  He is requesting this today.  Also requesting refill on his meloxicam.       Current medicines (including changes today)  Current Outpatient Medications   Medication Sig Dispense Refill    lurasidone (LATUDA) 40 MG Tab Take 40 mg by mouth with dinner.      ergocalciferol (DRISDOL) 59773 UNIT capsule Take 1 Capsule by mouth every 7 days. 4 Capsule 5    atorvastatin (LIPITOR) 20 MG Tab Take 1 Tablet by mouth every day. 30 Tablet 11    meloxicam (MOBIC) 15 MG tablet Take 1 Tablet by mouth 1 time a day as needed for Severe Pain. 30 Tablet 5    cyclobenzaprine (FLEXERIL) 10 mg Tab Take 1 Tablet by mouth 3 times a day as needed for Muscle Spasms. 30 Tablet 5    nicotine (NICODERM) 14 MG/24HR PATCH 24 HR Place 1 Patch on the skin every 24 hours. 28 Patch 0    nicotine (NICODERM) 7 MG/24HR PATCH 24 HR Place 1 Patch on the skin every 24 hours. 28 Patch 0    fluticasone (FLONASE) 50 MCG/ACT nasal spray Administer 1 Spray into affected nostril(S) every day. 16 g 11    fexofenadine (ALLEGRA) 180 MG tablet Take 1 Tablet by mouth every day. 30 Tablet 11     No current facility-administered medications for this visit.     He  has a past medical history of Allergy, Anxiety, Arthralgia of left hip, Depression, Hyperlipidemia, and Schizophrenia ([facility]).         Objective:     Vitals:    01/26/24 1441   BP: 100/60   Pulse: 88   Resp: 16   Temp: 36.7 °C (98.1 °F)   SpO2: 97%     Body mass index is 22.24 kg/m².   Physical Exam:  Constitutional: Alert, no distress.  Skin: Warm, dry, good turgor,  no rashes in visible areas.  Eye: Equal, round and reactive, conjunctiva clear, lids normal.  Psych: Alert and oriented x3, normal affect and mood.      Results and Imaging:   Hospital Outpatient Visit on 01/24/2024   Component Date Value Ref Range Status    Cholesterol,Tot 01/24/2024 236 (H)  100 - 199 mg/dL Final    Triglycerides 01/24/2024 107  0 - 149 mg/dL Final    HDL 01/24/2024 51  >=40 mg/dL Final    LDL 01/24/2024 164 (H)  <100 mg/dL Final    Glycohemoglobin 01/24/2024 5.3  4.0 - 5.6 % Final    Comment: Increased risk for diabetes:  5.7 -6.4%  Diabetes:  >6.4%  Glycemic control for adults with diabetes:  <7.0%    The above interpretations are per ADA guidelines.  Diagnosis  of diabetes mellitus on the basis of elevated Hemoglobin A1c  should be confirmed by repeating the Hb A1c test.      Est Avg Glucose 01/24/2024 105  mg/dL Final    Comment: The eAG calculation is based on the A1c-Derived Daily Glucose  (ADAG) study.  See the ADA's website for additional information.      25-Hydroxy   Vitamin D 25 01/24/2024 11 (L)  30 - 100 ng/mL Final    Comment: Adult Ranges:   <20 ng/mL - Deficiency  20-29 ng/mL - Insufficiency   ng/mL - Sufficiency  Electrochemiluminescence binding assay performed using Roche phyllis e  immunoassay analyzer.  The Ele[employer]ys Vitamin D total II assay is intended for  the quantitative determination of total 25 hydroxyvitamin D in human serum  and plasma. This assay is to be used as an aid in the assessment of vitamin  D sufficiency in adults.      HIV Ag/Ab Combo Assay 01/24/2024 Non-Reactive  Non Reactive Final    Comment: Roche HIV is a diagnostic test for the qualitative determination of HIV-1  p24 antigen and antibodies to HIV-1 (HIV-1 groups M and O) and HIV-2 in  human serum and plasma. A negative screen does not preclude the possibility  of exposure or infection. Consider retesting in high-risk patients, if  clinically indicated.      Fasting Status 01/24/2024 Fasting   Final          Assessment and Plan:   The following treatment plan was discussed    1. Dyslipidemia  Will start atorvastatin as below, obtain updated labs for his next visit in 3 months  - atorvastatin (LIPITOR) 20 MG Tab; Take 1 Tablet by mouth every day.  Dispense: 30 Tablet; Refill: 11  - Lipid Profile; Future    2. Vitamin D deficiency  Will start vitamin d as below, obtain updated labs for his next visit in 3 months  - ergocalciferol (DRISDOL) 94107 UNIT capsule; Take 1 Capsule by mouth every 7 days.  Dispense: 4 Capsule; Refill: 5  - VITAMIN D,25 HYDROXY (DEFICIENCY); Future    3. Chronic left hip pain  Able, controlled with current medications which were refilled today.  Completed Toradol shot in clinic today.  - meloxicam (MOBIC) 15 MG tablet; Take 1 Tablet by mouth 1 time a day as needed for Severe Pain.  Dispense: 30 Tablet; Refill: 5  - ketorolac (Toradol) injection 30 mg  - ketorolac (Toradol) injection 30 mg        Followup: Return in about 3 months (around 4/26/2024).

## 2024-01-29 ENCOUNTER — TELEPHONE (OUTPATIENT)
Dept: MEDICAL GROUP | Facility: MEDICAL CENTER | Age: 59
End: 2024-01-29
Payer: MEDICAID

## 2024-01-29 NOTE — TELEPHONE ENCOUNTER
Phone Number Called: [phone] (home)       Call outcome: Spoke to patient regarding message below.    Message: patient mention he still has the flu and want to get a refill on his TAMIFLU I told him he might need a appt to get evaluated he refused.

## 2024-01-31 ENCOUNTER — TELEPHONE (OUTPATIENT)
Dept: HEALTH INFORMATION MANAGEMENT | Facility: OTHER | Age: 59
End: 2024-01-31
Payer: MEDICAID

## 2024-02-01 NOTE — TELEPHONE ENCOUNTER
1/30/2024 - no answer  1/30/2024 - left message with call back number to consulting RN  1/31/2024 - left message with call back number to consulting RN

## 2024-02-01 NOTE — TELEPHONE ENCOUNTER
RN TRIAGE  Caller Name: David Petty                        Call Back Number: [phone]     Situation: follow up for tamaflu request    Recommendations: Advised to perform self care, monitor for worsening symptoms and to call back in 4 days if no improvement.     FYI only.      Pt stated he is feeling much better and has been using otc meds for symptoms and keeping hydrated.  Pt informed that next time he feels flu like symptoms he needs to be tested and positive for flu within 48 hours to be prescribed Tamamflu.  Pt gave v/u of instructions.

## 2024-05-09 ENCOUNTER — OFFICE VISIT (OUTPATIENT)
Dept: MEDICAL GROUP | Facility: MEDICAL CENTER | Age: 59
End: 2024-05-09
Attending: INTERNAL MEDICINE
Payer: MEDICAID

## 2024-05-09 VITALS
TEMPERATURE: 97.3 F | BODY MASS INDEX: 22.51 KG/M2 | HEART RATE: 94 BPM | DIASTOLIC BLOOD PRESSURE: 78 MMHG | SYSTOLIC BLOOD PRESSURE: 108 MMHG | WEIGHT: 166 LBS

## 2024-05-09 DIAGNOSIS — E78.5 DYSLIPIDEMIA: ICD-10-CM

## 2024-05-09 DIAGNOSIS — G89.29 CHRONIC LEFT HIP PAIN: ICD-10-CM

## 2024-05-09 DIAGNOSIS — E55.9 VITAMIN D DEFICIENCY: ICD-10-CM

## 2024-05-09 DIAGNOSIS — M25.552 CHRONIC LEFT HIP PAIN: ICD-10-CM

## 2024-05-09 DIAGNOSIS — M16.12 PRIMARY OSTEOARTHRITIS OF LEFT HIP: ICD-10-CM

## 2024-05-09 DIAGNOSIS — J30.2 SEASONAL ALLERGIES: ICD-10-CM

## 2024-05-09 PROCEDURE — 3078F DIAST BP <80 MM HG: CPT | Performed by: INTERNAL MEDICINE

## 2024-05-09 PROCEDURE — 99214 OFFICE O/P EST MOD 30 MIN: CPT | Performed by: INTERNAL MEDICINE

## 2024-05-09 PROCEDURE — 3074F SYST BP LT 130 MM HG: CPT | Performed by: INTERNAL MEDICINE

## 2024-05-09 RX ORDER — KETOROLAC TROMETHAMINE 30 MG/ML
30 INJECTION, SOLUTION INTRAMUSCULAR; INTRAVENOUS ONCE
Status: COMPLETED | OUTPATIENT
Start: 2024-05-09 | End: 2024-05-09

## 2024-05-09 RX ORDER — MELOXICAM 15 MG/1
15 TABLET ORAL
Qty: 30 TABLET | Refills: 5 | Status: SHIPPED | OUTPATIENT
Start: 2024-05-09

## 2024-05-09 RX ORDER — ATORVASTATIN CALCIUM 20 MG/1
20 TABLET, FILM COATED ORAL DAILY
Qty: 90 TABLET | Refills: 3 | Status: SHIPPED | OUTPATIENT
Start: 2024-05-09 | End: 2024-05-09 | Stop reason: SDUPTHER

## 2024-05-09 RX ORDER — ERGOCALCIFEROL 1.25 MG/1
50000 CAPSULE ORAL
Qty: 4 CAPSULE | Refills: 5 | Status: SHIPPED | OUTPATIENT
Start: 2024-05-09

## 2024-05-09 RX ORDER — ATORVASTATIN CALCIUM 20 MG/1
20 TABLET, FILM COATED ORAL DAILY
Qty: 30 TABLET | Refills: 11 | Status: SHIPPED | OUTPATIENT
Start: 2024-05-09 | End: 2024-05-09 | Stop reason: SDUPTHER

## 2024-05-09 RX ORDER — FLUTICASONE PROPIONATE 50 MCG
1 SPRAY, SUSPENSION (ML) NASAL DAILY
Qty: 16 G | Refills: 11 | Status: SHIPPED | OUTPATIENT
Start: 2024-05-09 | End: 2024-05-10 | Stop reason: SDUPTHER

## 2024-05-09 RX ORDER — FEXOFENADINE HCL 180 MG/1
180 TABLET ORAL DAILY
Qty: 30 TABLET | Refills: 11 | Status: SHIPPED | OUTPATIENT
Start: 2024-05-09

## 2024-05-09 RX ORDER — ATORVASTATIN CALCIUM 20 MG/1
20 TABLET, FILM COATED ORAL DAILY
Qty: 90 TABLET | Refills: 3 | Status: SHIPPED | OUTPATIENT
Start: 2024-05-09

## 2024-05-09 RX ADMIN — KETOROLAC TROMETHAMINE 30 MG: 30 INJECTION, SOLUTION INTRAMUSCULAR; INTRAVENOUS at 15:20

## 2024-05-09 RX ADMIN — KETOROLAC TROMETHAMINE 30 MG: 30 INJECTION, SOLUTION INTRAMUSCULAR; INTRAVENOUS at 15:21

## 2024-05-09 ASSESSMENT — FIBROSIS 4 INDEX: FIB4 SCORE: 1.59

## 2024-05-09 NOTE — TELEPHONE ENCOUNTER
Pharmacy/ insurance is requesting 90 day Rx, RX to show update     Received request via: Pharmacy    Was the patient seen in the last year in this department? Yes    Does the patient have an active prescription (recently filled or refills available) for medication(s) requested? No    Ph[facility] Name[facility]    Does the patient have [facility] and need 100 day supply (blood pressure, diabetes and cholesterol meds only)? Patient does not have SCP

## 2024-05-10 DIAGNOSIS — J30.2 SEASONAL ALLERGIES: ICD-10-CM

## 2024-05-10 RX ORDER — FLUTICASONE PROPIONATE 50 MCG
1 SPRAY, SUSPENSION (ML) NASAL DAILY
Qty: 32 G | Refills: 3 | Status: SHIPPED | OUTPATIENT
Start: 2024-05-10

## 2024-05-10 NOTE — ASSESSMENT & PLAN NOTE
Last vitamin D level checked about 4 months ago was low at 11.  He is currently taking 50,000 units vitamin D2 weekly.

## 2024-05-10 NOTE — ASSESSMENT & PLAN NOTE
Currently taking atorvastatin 20 mg daily.  Requesting refills.  The 10-year ASCVD risk score (Janette MALHOTRA, et al., 2019) is: 10.7%

## 2024-05-10 NOTE — ASSESSMENT & PLAN NOTE
He continues on Allegra and Flonase.  Reports good control of his symptoms on this regimen and is requesting refills today.

## 2024-05-10 NOTE — PROGRESS NOTES
Subjective:   David Petty is a 58 y.o. male here today for medication refills, toradol shot    Chronic left hip pain  Patient continues with conservative management and still does not want to have surgery at this time.  He gets good relief from Toradol injections which he typically comes in for every 1 to 3 months and he is requesting this today.  He also continues on meloxicam and he is requesting a refill.    Seasonal allergies  He continues on Allegra and Flonase.  Reports good control of his symptoms on this regimen and is requesting refills today.    Dyslipidemia  Currently taking atorvastatin 20 mg daily.  Requesting refills.  The 10-year ASCVD risk score (Janette MALHOTRA, et al., 2019) is: 10.7%      Vitamin D deficiency  Last vitamin D level checked about 4 months ago was low at 11.  He is currently taking 50,000 units vitamin D2 weekly.       Current medicines (including changes today)  Current Outpatient Medications   Medication Sig Dispense Refill    ergocalciferol (DRISDOL) 77986 UNIT capsule Take 1 Capsule by mouth every 7 days. 4 Capsule 5    meloxicam (MOBIC) 15 MG tablet Take 1 Tablet by mouth 1 time a day as needed for Severe Pain. 30 Tablet 5    fluticasone (FLONASE) 50 MCG/ACT nasal spray Administer 1 Spray into affected nostril(S) every day. 16 g 11    fexofenadine (ALLEGRA) 180 MG tablet Take 1 Tablet by mouth every day. 30 Tablet 11    lurasidone (LATUDA) 40 MG Tab Take 40 mg by mouth with dinner.      cyclobenzaprine (FLEXERIL) 10 mg Tab Take 1 Tablet by mouth 3 times a day as needed for Muscle Spasms. 30 Tablet 5    atorvastatin (LIPITOR) 20 MG Tab Take 1 Tablet by mouth every day. 90 Tablet 3     No current facility-administered medications for this visit.     He  has a past medical history of Allergy, Anxiety, Arthralgia of left hip, Depression, Hyperlipidemia, and Schizophrenia ([facility]).         Objective:     Vitals:    05/09/24 1415   BP: 108/78   Pulse: 94   Temp: 36.3 °C (97.3 °F)     Body  mass index is 22.51 kg/m².   Physical Exam:  Constitutional: Alert, no distress.  Skin: Warm, dry, good turgor, no rashes in visible areas.  Eye: Equal, round and reactive, conjunctiva clear, lids normal.  Psych: Alert and oriented x3, normal affect and mood.      Assessment and Plan:   The following treatment plan was discussed    1. Primary osteoarthritis of left hip  4. Chronic left hip pain  We will continue to manage conservatively per patient preference.  Toradol given today at his request.  He will continue with meloxicam as needed and follow-up with orthopedics when he is ready to have hip replacement  - ketorolac (Toradol) injection 30 mg  - ketorolac (Toradol) injection 30 mg  - meloxicam (MOBIC) 15 MG tablet; Take 1 Tablet by mouth 1 time a day as needed for Severe Pain.  Dispense: 30 Tablet; Refill: 5    2. Vitamin D deficiency  Stable, well controlled with current meds which were refilled today.   - ergocalciferol (DRISDOL) 95191 UNIT capsule; Take 1 Capsule by mouth every 7 days.  Dispense: 4 Capsule; Refill: 5  -repeat labs at next visit    3. Dyslipidemia  Stable, well controlled with current meds which were refilled today.   -atorvastatin 20 mg daily  -repeat labs at next visit    5. Seasonal allergies  Stable, well controlled with current meds which were refilled today.   - fluticasone (FLONASE) 50 MCG/ACT nasal spray; Administer 1 Spray into affected nostril(S) every day.  Dispense: 16 g; Refill: 11  - fexofenadine (ALLEGRA) 180 MG tablet; Take 1 Tablet by mouth every day.  Dispense: 30 Tablet; Refill: 11        Followup: Return in about 3 months (around 8/9/2024).

## 2024-05-10 NOTE — ASSESSMENT & PLAN NOTE
Patient continues with conservative management and still does not want to have surgery at this time.  He gets good relief from Toradol injections which he typically comes in for every 1 to 3 months and he is requesting this today.  He also continues on meloxicam and he is requesting a refill.

## 2024-05-10 NOTE — TELEPHONE ENCOUNTER
Insurance/pharmacy is requesting 32g on fluticasone. RX to show update    Received request via: Pharmacy    Was the patient seen in the last year in this department? Yes    Does the patient have an active prescription (recently filled or refills available) for medication(s) requested? No    [facility]    Does the patient have group home [facility] and need 100 day supply (blood pressure, diabetes and cholesterol meds only)? Patient does not have SCP

## 2024-05-14 ENCOUNTER — HOSPITAL ENCOUNTER (EMERGENCY)
Facility: MEDICAL CENTER | Age: 59
End: 2024-05-15
Attending: STUDENT IN AN ORGANIZED HEALTH CARE EDUCATION/TRAINING PROGRAM
Payer: MEDICAID

## 2024-05-14 VITALS
RESPIRATION RATE: 18 BRPM | TEMPERATURE: 97.6 F | BODY MASS INDEX: 21.62 KG/M2 | DIASTOLIC BLOOD PRESSURE: 81 MMHG | OXYGEN SATURATION: 95 % | HEART RATE: 72 BPM | WEIGHT: 163.14 LBS | HEIGHT: 73 IN | SYSTOLIC BLOOD PRESSURE: 112 MMHG

## 2024-05-14 DIAGNOSIS — U07.1 COVID-19: ICD-10-CM

## 2024-05-14 ASSESSMENT — FIBROSIS 4 INDEX: FIB4 SCORE: 1.59

## 2024-05-15 LAB
FLUAV RNA SPEC QL NAA+PROBE: NEGATIVE
FLUBV RNA SPEC QL NAA+PROBE: NEGATIVE
RSV RNA SPEC QL NAA+PROBE: NEGATIVE
SARS-COV-2 RNA RESP QL NAA+PROBE: DETECTED
SPECIMEN SOURCE: ABNORMAL

## 2024-05-15 RX ORDER — ONDANSETRON 4 MG/1
4 TABLET, ORALLY DISINTEGRATING ORAL EVERY 6 HOURS PRN
Qty: 10 TABLET | Refills: 0 | Status: SHIPPED | OUTPATIENT
Start: 2024-05-15

## 2024-05-15 RX ORDER — ACETAMINOPHEN 500 MG
1000 TABLET ORAL ONCE
Status: COMPLETED | OUTPATIENT
Start: 2024-05-15 | End: 2024-05-15

## 2024-05-15 RX ORDER — IBUPROFEN 600 MG/1
600 TABLET ORAL ONCE
Status: COMPLETED | OUTPATIENT
Start: 2024-05-15 | End: 2024-05-15

## 2024-05-15 RX ADMIN — IBUPROFEN 600 MG: 600 TABLET, FILM COATED ORAL at 00:30

## 2024-05-15 RX ADMIN — ACETAMINOPHEN 1000 MG: 500 TABLET, FILM COATED ORAL at 00:30

## 2024-05-15 NOTE — DISCHARGE INSTRUCTIONS
He can take Tylenol 1000 mg Motrin 600 mg every 6 hours as needed for fever, muscle aches.  You take the Zofran as needed for nausea, vomiting.  Return if you are unable to eat or drink for more than 6 hours, difficulty speaking difficulty breathing, or your oxygen level at home is less than 90%.  Exercise hand hygiene, masking as you are contagious.

## 2024-05-15 NOTE — ED PROVIDER NOTES
"ED Provider Note    CHIEF COMPLAINT  Chief Complaint   Patient presents with    Fever     Pt states he has fever and chills. Spouse recently diagnosed with COVID.  States taste buds are \"off\"    Headache    Cough     Productive cough       EXTERNAL RECORDS REVIEWED  Outpatient Notes primary care visit 5/9/2024, history of osteoarthritis of the right hip, he takes Toradol as needed, meloxicam, he also uses medical marijuana.    HPI/ROS  LIMITATION TO HISTORY   Select: : None  OUTSIDE HISTORIAN(S):  Family patient's spouse is at bedside, she notes that she tested positive for COVID 4 days ago    David Petty is a 58 y.o. male who presents with cough congestion fever chills, and generalized headache for 1 day. Spouse with covid.  Denies abdominal pain, vomiting.  Denies nausea currently.  Took Motrin about 7 hours ago which improved symptoms.  Has received COVID vaccination 1 time.  No history of lung disease.  Does smoke marijuana and tobacco.  Shortness of breath.  No chest pain.    No urinary symptoms.  No abdominal pain.        PAST MEDICAL HISTORY   has a past medical history of Allergy, Anxiety, Arthralgia of left hip, Depression, Hyperlipidemia, and Schizophrenia ([facility]).    SURGICAL HISTORY   has a past surgical history that includes appendectomy; arthroplasty; and other abdominal surgery.    FAMILY HISTORY  Family History   Problem Relation Age of Onset    Diabetes Maternal Grandmother     Cancer Neg Hx     Lung Disease Neg Hx     Heart Disease Neg Hx     Hypertension Neg Hx     Hyperlipidemia Neg Hx     Stroke Neg Hx        SOCIAL HISTORY  Social History     Tobacco Use    Smoking status: Every Day     Types: Cigarettes    Smokeless tobacco: Never    Tobacco comments:     One cig/day, previous 1 ppd   Vaping Use    Vaping Use: Never used   Substance and Sexual Activity    Alcohol use: Not Currently     Comment: history of alcoholism, was drinking 18 beers daily until June 2022, started drinking as teenager " "   Drug use: Not Currently     Types: Marijuana     Comment: h/o MJ, quit June 2022    Sexual activity: Not on file       CURRENT MEDICATIONS  Home Medications    **Home medications have not yet been reviewed for this encounter**         ALLERGIES  No Known Allergies    PHYSICAL EXAM  VITAL SIGNS: /81   Pulse 72   Temp 36.4 °C (97.6 °F) (Temporal)   Resp 18   Ht 1.854 m (6' 1\")   Wt 74 kg (163 lb 2.3 oz)   SpO2 95%   BMI 21.52 kg/m²    General: Pleasant male, mildly uncomfortable appearing, lying supine, no respiratory distress, spouse is sitting next to him.  Head: Normocephalic atraumatic  Eyes: Extraocular motion intact  Neck: Supple, no rigidity  Cardiovascular: Regular rate and rhythm no murmurs rubs or gallops  Respiratory: Clear to auscultation bilaterally, equal chest rise and fall, no increased work of breathing  Abdomen: Soft nontender no guarding  Musculoskeletal: Warm and well perfused, no peripheral edema  Neuro: Alert, no focal deficits  Integumentary: No wounds or rashes      EKG/LABS  Labs Reviewed   COV-2, FLU A/B, AND RSV BY PCR ([facility]) - Abnormal; Notable for the following components:       Result Value    SARS-CoV-2 by PCR DETECTED (*)     All other components within normal limits         Radiologist interpretation:  No orders to display       COURSE & MEDICAL DECISION MAKING    ASSESSMENT, COURSE AND PLAN  Care Narrative: 58-year-old male presenting with cough, congestion, headache x 1 day, spouse recently diagnosed with COVID-19, patient feels he may have the same.  He is not immunocompromise, he has no shortness of breath, chest discomfort abdominal discomfort or difficulty tolerating p.o.  His vital signs here are unremarkable without fever tachycardia, hypotension hypoxia or tachypnea.  On exam, he is clear to auscultation he is nontoxic-appearing overall, in no respiratory distress.  His abdomen is benign.      Suspect patient's symptoms are due to COVID-19 given his recent " sick contact, and the constellation of his symptoms, will obtain swab, we discussed symptomatic treatment at this time, thankfully, patient does not have any significant identified risk factors for progression to COVID-pneumonia.      I discussed the patient's positive COVID test, he has Tylenol and Motrin at home, dosing was discussed, he is written for Zofran, as needed, he will be given a dose of Tylenol Motrin before going home, return precautions discussed, all questions were answered.  We did discuss Paxlovid, shared decision making we will defer this prescription given minimal benefit on more recent studies.            DISPOSITION AND DISCUSSIONS    Discussion of management with other QHP or appropriate source(s): None     Escalation of care considered, and ultimately not performed:Laboratory analysis and diagnostic imaging I considered chest x-ray, I considered laboratory testing, urinalysis, given fever and patient's age, however given positive COVID test, clear lung sounds, stable vital signs, feel ultimately this would not be beneficial or  in any meaningful way.  These were ultimately deferred.  FINAL DIAGNOSIS  1. COVID-19           Electronically signed by: Sae Chris M.D., 5/15/2024 12:06 AM

## 2024-05-15 NOTE — ED NOTES
Patient cleared for DC home, no further questions or concerns pt verbalized understanding regarding f/u with pcp. Education for new RX provided, pt in stable conditio advised to return to closest ED for any worsening/concerning symptoms

## 2024-05-15 NOTE — ED TRIAGE NOTES
"Pt here with c/o    Chief Complaint   Patient presents with    Fever     Pt states he has fever and chills. Spouse recently diagnosed with COVID.  States taste buds are \"off\"    Headache    Cough     Productive cough       /81   Pulse 72   Temp 36.4 °C (97.6 °F) (Temporal)   Resp 18   Ht 1.854 m (6' 1\")   Wt 74 kg (163 lb 2.3 oz)   SpO2 95%   BMI 21.52 kg/m²   "

## 2025-02-21 ENCOUNTER — OFFICE VISIT (OUTPATIENT)
Dept: MEDICAL GROUP | Facility: MEDICAL CENTER | Age: 60
End: 2025-02-21
Attending: INTERNAL MEDICINE
Payer: MEDICAID

## 2025-02-21 VITALS
DIASTOLIC BLOOD PRESSURE: 68 MMHG | HEIGHT: 73 IN | WEIGHT: 178.6 LBS | SYSTOLIC BLOOD PRESSURE: 114 MMHG | BODY MASS INDEX: 23.67 KG/M2 | TEMPERATURE: 97.1 F | RESPIRATION RATE: 16 BRPM | HEART RATE: 74 BPM | OXYGEN SATURATION: 98 %

## 2025-02-21 DIAGNOSIS — M25.552 CHRONIC LEFT HIP PAIN: ICD-10-CM

## 2025-02-21 DIAGNOSIS — M16.12 PRIMARY OSTEOARTHRITIS OF LEFT HIP: ICD-10-CM

## 2025-02-21 DIAGNOSIS — E55.9 VITAMIN D DEFICIENCY: ICD-10-CM

## 2025-02-21 DIAGNOSIS — G89.29 CHRONIC LEFT HIP PAIN: ICD-10-CM

## 2025-02-21 DIAGNOSIS — E78.5 DYSLIPIDEMIA: ICD-10-CM

## 2025-02-21 PROCEDURE — 99214 OFFICE O/P EST MOD 30 MIN: CPT | Performed by: INTERNAL MEDICINE

## 2025-02-21 PROCEDURE — 99214 OFFICE O/P EST MOD 30 MIN: CPT | Mod: 25 | Performed by: INTERNAL MEDICINE

## 2025-02-21 PROCEDURE — 700111 HCHG RX REV CODE 636 W/ 250 OVERRIDE (IP): Mod: JZ,UD

## 2025-02-21 PROCEDURE — 96372 THER/PROPH/DIAG INJ SC/IM: CPT | Performed by: INTERNAL MEDICINE

## 2025-02-21 PROCEDURE — 3078F DIAST BP <80 MM HG: CPT | Performed by: INTERNAL MEDICINE

## 2025-02-21 PROCEDURE — 3074F SYST BP LT 130 MM HG: CPT | Performed by: INTERNAL MEDICINE

## 2025-02-21 RX ORDER — KETOROLAC TROMETHAMINE 15 MG/ML
15 INJECTION, SOLUTION INTRAMUSCULAR; INTRAVENOUS ONCE
Status: COMPLETED | OUTPATIENT
Start: 2025-02-21 | End: 2025-02-21

## 2025-02-21 RX ORDER — ATORVASTATIN CALCIUM 20 MG/1
20 TABLET, FILM COATED ORAL DAILY
Qty: 90 TABLET | Refills: 3 | Status: SHIPPED | OUTPATIENT
Start: 2025-02-21

## 2025-02-21 RX ORDER — ERGOCALCIFEROL 1.25 MG/1
50000 CAPSULE ORAL
Qty: 4 CAPSULE | Refills: 5 | Status: SHIPPED | OUTPATIENT
Start: 2025-02-21

## 2025-02-21 RX ORDER — LIDOCAINE 40 MG/G
CREAM TOPICAL
Qty: 120 G | Refills: 1 | Status: SHIPPED | OUTPATIENT
Start: 2025-02-21

## 2025-02-21 RX ORDER — ACETAMINOPHEN 500 MG
500-1000 TABLET ORAL EVERY 6 HOURS PRN
Qty: 120 TABLET | Refills: 3 | Status: SHIPPED | OUTPATIENT
Start: 2025-02-21

## 2025-02-21 RX ADMIN — KETOROLAC TROMETHAMINE 15 MG: 15 INJECTION, SOLUTION INTRAMUSCULAR; INTRAVENOUS at 15:20

## 2025-02-21 ASSESSMENT — FIBROSIS 4 INDEX: FIB4 SCORE: 1.62

## 2025-02-22 NOTE — PROGRESS NOTES
Subjective:   David Petty is a 59 y.o. male here today for hip pain    Primary osteoarthritis of left hip  He presents today to discuss his continued hip pain.  Since his last visit he feels that it has worsened.  He had an episode several days ago where he was unable to get up off of the floor due to the pain.  We reviewed his imaging from 3 years ago which showed severe/end-stage hip OA at that time.  He has been reluctant to have a hip replacement surgery due to numerous fears surrounding the surgery and hospitalization.  He has been taking meloxicam 15 mg but admits that lately he has been taking up to 3 tablets daily in addition to ibuprofen 800 mg 3 tablets daily.  We discussed that this is not safe for him and has a high likelihood of causing renal and gastric side effects.  He is wondering what else he can do for the pain.  He has not had any NSAIDs today in anticipation of receiving a Toradol shot which usually helps him for at least a week.    Vitamin D deficiency  He has not been consistent with his supplemental vitamin D, states he forgets to take it because it is once a week but he does have a pillbox now.    Dyslipidemia  Has been taking his atorvastatin intermittently.       Current medicines (including changes today)  Current Outpatient Medications   Medication Sig Dispense Refill    acetaminophen (TYLENOL) 500 MG Tab Take 1-2 Tablets by mouth every 6 hours as needed for Moderate Pain. Not to exceed 4 tabs per day 120 Tablet 3    lidocaine (LMX) 4 % Cream Apply to thin layer to painful area of hip/low back up to TID  g 1    ergocalciferol (DRISDOL) 80243 UNIT capsule Take 1 Capsule by mouth every 7 days. 4 Capsule 5    atorvastatin (LIPITOR) 20 MG Tab Take 1 Tablet by mouth every day. 90 Tablet 3    ondansetron (ZOFRAN ODT) 4 MG TABLET DISPERSIBLE Take 1 Tablet by mouth every 6 hours as needed for Nausea/Vomiting. 10 Tablet 0    fluticasone (FLONASE) 50 MCG/ACT nasal spray Administer 1  Spray into affected nostril(S) every day. 32 g 3    meloxicam (MOBIC) 15 MG tablet Take 1 Tablet by mouth 1 time a day as needed for Severe Pain. 30 Tablet 5    fexofenadine (ALLEGRA) 180 MG tablet Take 1 Tablet by mouth every day. 30 Tablet 11    lurasidone (LATUDA) 40 MG Tab Take 40 mg by mouth with dinner.      cyclobenzaprine (FLEXERIL) 10 mg Tab Take 1 Tablet by mouth 3 times a day as needed for Muscle Spasms. 30 Tablet 5     No current facility-administered medications for this visit.     He  has a past medical history of Allergy, Anxiety, Arthralgia of left hip, Depression, Hyperlipidemia, and Schizophrenia ([facility]).         Objective:     Vitals:    02/21/25 1435   BP: 114/68   Pulse: 74   Resp: 16   Temp: 36.2 °C (97.1 °F)   SpO2: 98%     Body mass index is 23.56 kg/m².   Physical Exam:  Constitutional: Alert, no distress.  Skin: Warm, dry, good turgor, no rashes in visible areas.  Eye: Equal, round and reactive, conjunctiva clear, lids normal.  Psych: Alert and oriented x3, normal affect and mood.      Assessment and Plan:   The following treatment plan was discussed    1. Chronic left hip pain  2. Primary osteoarthritis of left hip  We reviewed the severity of his hip arthritis and that the best treatment continues to remain a hip replacement.  Discussed that he is over taking NSAIDs and needs to stop the ibuprofen and reduce meloxicam to 1 tablet/day maximum.  Will add Tylenol and lidocaine for additional pain control.  Referral placed to orthopedics as he is debating whether he would like a consultation for surgery.  - acetaminophen (TYLENOL) 500 MG Tab; Take 1-2 Tablets by mouth every 6 hours as needed for Moderate Pain. Not to exceed 4 tabs per day  Dispense: 120 Tablet; Refill: 3  - lidocaine (LMX) 4 % Cream; Apply to thin layer to painful area of hip/low back up to TID PRN  Dispense: 120 g; Refill: 1  - ketorolac (Toradol) 15 MG/ML injection 15 mg  - ketorolac (Toradol) 15 MG/ML injection 15 mg  -  Referral to Orthopedics    3. Vitamin D deficiency  Discussed regular use of his medication, will repeat labs once he is consistently taking it for at least 6 to 8 weeks  - ergocalciferol (DRISDOL) 15145 UNIT capsule; Take 1 Capsule by mouth every 7 days.  Dispense: 4 Capsule; Refill: 5    4. Dyslipidemia  Discussed regular use of his medication, will repeat labs once he is consistently taking it for at least 6 to 8 weeks  - atorvastatin (LIPITOR) 20 MG Tab; Take 1 Tablet by mouth every day.  Dispense: 90 Tablet; Refill: 3        Followup: Return in about 6 months (around 8/21/2025).

## 2025-02-22 NOTE — ASSESSMENT & PLAN NOTE
He has not been consistent with his supplemental vitamin D, states he forgets to take it because it is once a week but he does have a pillbox now.

## 2025-02-22 NOTE — ASSESSMENT & PLAN NOTE
He presents today to discuss his continued hip pain.  Since his last visit he feels that it has worsened.  He had an episode several days ago where he was unable to get up off of the floor due to the pain.  We reviewed his imaging from 3 years ago which showed severe/end-stage hip OA at that time.  He has been reluctant to have a hip replacement surgery due to numerous fears surrounding the surgery and hospitalization.  He has been taking meloxicam 15 mg but admits that lately he has been taking up to 3 tablets daily in addition to ibuprofen 800 mg 3 tablets daily.  We discussed that this is not safe for him and has a high likelihood of causing renal and gastric side effects.  He is wondering what else he can do for the pain.  He has not had any NSAIDs today in anticipation of receiving a Toradol shot which usually helps him for at least a week.

## 2025-04-02 ENCOUNTER — OFFICE VISIT (OUTPATIENT)
Dept: MEDICAL GROUP | Facility: MEDICAL CENTER | Age: 60
End: 2025-04-02
Attending: INTERNAL MEDICINE
Payer: MEDICAID

## 2025-04-02 ENCOUNTER — TELEPHONE (OUTPATIENT)
Dept: VASCULAR LAB | Facility: MEDICAL CENTER | Age: 60
End: 2025-04-02

## 2025-04-02 VITALS
WEIGHT: 178 LBS | BODY MASS INDEX: 23.59 KG/M2 | HEART RATE: 70 BPM | SYSTOLIC BLOOD PRESSURE: 106 MMHG | OXYGEN SATURATION: 95 % | HEIGHT: 73 IN | RESPIRATION RATE: 16 BRPM | TEMPERATURE: 98.3 F | DIASTOLIC BLOOD PRESSURE: 60 MMHG

## 2025-04-02 DIAGNOSIS — Z72.0 TOBACCO USE: ICD-10-CM

## 2025-04-02 DIAGNOSIS — Z09 HOSPITAL DISCHARGE FOLLOW-UP: ICD-10-CM

## 2025-04-02 DIAGNOSIS — I26.99 BILATERAL PULMONARY EMBOLISM (HCC): ICD-10-CM

## 2025-04-02 PROCEDURE — 99213 OFFICE O/P EST LOW 20 MIN: CPT | Performed by: INTERNAL MEDICINE

## 2025-04-02 PROCEDURE — 99214 OFFICE O/P EST MOD 30 MIN: CPT | Performed by: INTERNAL MEDICINE

## 2025-04-02 PROCEDURE — 3074F SYST BP LT 130 MM HG: CPT | Performed by: INTERNAL MEDICINE

## 2025-04-02 PROCEDURE — 3078F DIAST BP <80 MM HG: CPT | Performed by: INTERNAL MEDICINE

## 2025-04-02 RX ORDER — HYDROCODONE BITARTRATE AND ACETAMINOPHEN 5; 325 MG/1; MG/1
TABLET ORAL
COMMUNITY
Start: 2025-03-31 | End: 2025-04-25

## 2025-04-02 RX ORDER — APIXABAN 5 MG/1
10 TABLET, FILM COATED ORAL 2 TIMES DAILY
COMMUNITY
Start: 2025-03-28

## 2025-04-02 ASSESSMENT — FIBROSIS 4 INDEX: FIB4 SCORE: 1.62

## 2025-04-02 NOTE — ASSESSMENT & PLAN NOTE
"Patient presents today for hospital discharge follow-up.  He was recently admitted at [facility].  We have the ER note for this but not the discharge summary or the full documentation of his stay.  However, based on those findings and patient report, he was found to have bilateral pulmonary emboli of moderate clot burden after presenting with some upper back pain and shortness of breath of acute onset.  He remembers that about 3 weeks prior to this he was having some left leg tenderness and swelling along the inside of the lower leg and he thought this was mostly related to his chronic hip arthritis on that side.  He was massaging it for treatment and taking ibuprofen.  According to patient, etiology of the clot was not fully elucidated during his admission.  They thought he might have a genetic cause because his mother had a history of multiple miscarriages but he knows very little about his family history in general.  He has never had a blood clot in the past.  From my review, I can only see his CTA.  There was some small lung nodules but nothing concerning for malignancy.  He is concerned about some unintentional weight loss however we reviewed his past weights which have been completely stable at our clinic.  He is currently taking his Eliquis as prescribed although duration of therapy is not clear.     Of note, he reports returning to the emergency room after his initial admission because of a severe headache.  He describes the feeling of \"a fist behind my eye\".  He remarks that brain imaging was done (unclear if it was MRI or CT) and that it was normal.  We do not have these records for review but have requested them today.  He states that the headache has gone.  He denies any numbness, weakness, or tingling in his body.  There have been no changes in his mentation or speech.  "

## 2025-04-02 NOTE — TELEPHONE ENCOUNTER
[facility]     Received anticoagulation referral from Dr Zimmerman on 4/2/25.     Called patient to schedule an appt to establish care. No answer. LVM.    1st attempt     Insurance: Medicaid  PCP: [facility]  Locations to be seen: [facility]    If no response by 5/2/25 OR 2 no shows/cancellations, will remove from referral list    Courtney Wu PharmD  [facility]armac[facility]  Phone [phone]

## 2025-04-02 NOTE — ASSESSMENT & PLAN NOTE
During his hospital admission for PE, he was told to quit smoking.  He has subsequently switched to a vape for the past 1 day.

## 2025-04-02 NOTE — PROGRESS NOTES
"Subjective:   David Petty is a 59 y.o. male here today for hospital follow up      Hospital discharge follow-up  Patient presents today for hospital discharge follow-up.  He was recently admitted at [facility].  We have the ER note for this but not the discharge summary or the full documentation of his stay.  However, based on those findings and patient report, he was found to have bilateral pulmonary emboli of moderate clot burden after presenting with some upper back pain and shortness of breath of acute onset.  He remembers that about 3 weeks prior to this he was having some left leg tenderness and swelling along the inside of the lower leg and he thought this was mostly related to his chronic hip arthritis on that side.  He was massaging it for treatment and taking ibuprofen.  He reports that he had bilateral lower extremity ultrasounds done which did not show any blood clots although we do not have these records for review.  According to patient, etiology of the clot was not fully elucidated during his admission.  They thought he might have a genetic cause because his mother had a history of multiple miscarriages but he knows very little about his family history in general.  He has never had a blood clot in the past.  From my review, I can only see his CTA.  There was some small lung nodules but nothing concerning for malignancy.  He is concerned about some unintentional weight loss however we reviewed his past weights which have been completely stable at our clinic.  He is currently taking his Eliquis as prescribed although duration of therapy is not clear.     Of note, he reports returning to the emergency room after his initial admission because of a severe headache.  He describes the feeling of \"a fist behind my eye\".  He remarks that brain imaging was done (unclear if it was MRI or CT) and that it was normal.  We do not have these records for review but have requested them today.  " He states that the headache has gone.  He denies any numbness, weakness, or tingling in his body.  There have been no changes in his mentation or speech.    Tobacco use  During his hospital admission for PE, he was told to quit smoking.  He has subsequently switched to a vape for the past 1 day.       Current medicines (including changes today)  Current Outpatient Medications   Medication Sig Dispense Refill    HYDROcodone-acetaminophen (NORCO) 5-325 MG Tab per tablet TAKE 1 TABLET BY MOUTH EVERY 6 HOURS AS NEEDED FOR PAIN FOR 3 DAYS      ELIQUIS 5 MG Tab Take 10 mg by mouth 2 times a day.      acetaminophen (TYLENOL) 500 MG Tab Take 1-2 Tablets by mouth every 6 hours as needed for Moderate Pain. Not to exceed 4 tabs per day 120 Tablet 3    lidocaine (LMX) 4 % Cream Apply to thin layer to painful area of hip/low back up to TID  g 1    ergocalciferol (DRISDOL) 00879 UNIT capsule Take 1 Capsule by mouth every 7 days. 4 Capsule 5    atorvastatin (LIPITOR) 20 MG Tab Take 1 Tablet by mouth every day. 90 Tablet 3    ondansetron (ZOFRAN ODT) 4 MG TABLET DISPERSIBLE Take 1 Tablet by mouth every 6 hours as needed for Nausea/Vomiting. 10 Tablet 0    fluticasone (FLONASE) 50 MCG/ACT nasal spray Administer 1 Spray into affected nostril(S) every day. 32 g 3    fexofenadine (ALLEGRA) 180 MG tablet Take 1 Tablet by mouth every day. 30 Tablet 11    lurasidone (LATUDA) 40 MG Tab Take 40 mg by mouth with dinner.      cyclobenzaprine (FLEXERIL) 10 mg Tab Take 1 Tablet by mouth 3 times a day as needed for Muscle Spasms. 30 Tablet 5     No current facility-administered medications for this visit.     He  has a past medical history of Allergy, Anxiety, Arthralgia of left hip, Depression, Hyperlipidemia, and Schizophrenia (Formerly [facility]).         Objective:     Vitals:    04/02/25 1048   BP: 106/60   Pulse: 70   Resp: 16   Temp: 36.8 °C (98.3 °F)   SpO2: 95%     Body mass index is 23.48 kg/m².   Physical Exam:  Constitutional: Alert, no  distress.  Skin: Warm, dry, good turgor, no rashes in visible areas.  Eye: Equal, round and reactive, conjunctiva clear, lids normal.  Respiratory: Unlabored respiratory effort, lungs clear to auscultation, no wheezes, no ronchi.  Cardiovascular: Regular rate and rhythm, no murmurs appreciated, trace LLE pitting edema  Psych: Alert and oriented x3, normal affect and mood.      Assessment and Plan:   The following treatment plan was discussed    1. Bilateral pulmonary embolism (HCC)  2. Hospital discharge follow-up  At this time, etiology of his PEs is unclear.  May have started as a DVT although patient reports negative lower extremity ultrasound.  There was no prolonged immobility prior to this and no surgery prior to this.  Top on differential would be either hereditary hypercoagulability or malignancy, have requested all records from [facility] to see what imaging has been done but no obvious malignancy on CTA of chest.  Have placed referral to anticoag clinic for recommendation on duration of therapy.  -Continue apixaban as prescribed  - Referral to Anticoagulation Monitoring  -f/u [facility] records    3. Tobacco use  He has switched to e-cigarettes.  Will continue to encourage weaning down/eventual cessation        Followup: Return in about 3 months (around 7/2/2025).

## 2025-04-02 NOTE — ASSESSMENT & PLAN NOTE
Left leg pain and ankle pain a few weeks before couldn't walk or stand   ? Heriditary mom with misscarraiges but no known clot

## 2025-04-09 ENCOUNTER — TELEPHONE (OUTPATIENT)
Dept: VASCULAR LAB | Facility: MEDICAL CENTER | Age: 60
End: 2025-04-09
Payer: MEDICAID

## 2025-04-09 NOTE — LETTER
David Petty  [address]  [place] [address]    04/09/25    Dear David Petty ,    We have been unsuccessful in our attempts to contact you regarding your Anticoagulation Service appointments. Eliquis is a potent blood-thinning agent that requires monitoring to ensure that the dosage is correct for your body.  If it isn't, you could develop serious, sometimes life-threatening bleeding problems or life-threatening blood clots or stroke could result.    To monitor you effectively, we need to be able to communicate with you.  This is a requirement to be followed by our Service.       If you repeatedly fail to keep your lab appointments, you are at risk of being discharged from the Anticoagulation Service.    It is extremely important that you contact the clinic as soon as possible to arrange appropriate follow up.  We are open Monday-Friday 8 am until 5 pm.  You may reach our Service at [phone].           Sincerely,           Jose Manuel Yates, PharmD, [facility]  Clinic Supervisor  [facility]  Outpatient Anticoagulation Service

## 2025-04-09 NOTE — TELEPHONE ENCOUNTER
[facility]     Received anticoagulation referral from Dr Zimmerman on 4/2/25.     Called patient to schedule an appt to establish care. No answer. LVM.  Called emergency contact Maria Eugenia (mother) - as well as primary number on file.      2nd attempt   Sent letter today (4/9/25)     Insurance: Medicaid  PCP: [facility]  Locations to be seen: [facility]      If no response by 5/2/25 OR 2 no shows/cancellations, will remove from referral list     Rachel MclaughlinD  [facility]armac[facility]  Phone [phone]

## 2025-04-15 ENCOUNTER — DOCUMENTATION (OUTPATIENT)
Dept: VASCULAR LAB | Facility: MEDICAL CENTER | Age: 60
End: 2025-04-15

## 2025-04-15 ENCOUNTER — ANTICOAGULATION VISIT (OUTPATIENT)
Dept: VASCULAR LAB | Facility: MEDICAL CENTER | Age: 60
End: 2025-04-15
Attending: INTERNAL MEDICINE
Payer: MEDICAID

## 2025-04-15 VITALS — SYSTOLIC BLOOD PRESSURE: 105 MMHG | HEART RATE: 68 BPM | DIASTOLIC BLOOD PRESSURE: 71 MMHG

## 2025-04-15 DIAGNOSIS — I26.99 BILATERAL PULMONARY EMBOLISM (HCC): ICD-10-CM

## 2025-04-15 PROCEDURE — 99213 OFFICE O/P EST LOW 20 MIN: CPT

## 2025-04-15 ASSESSMENT — CHA2DS2 SCORE
PRIOR STROKE OR TIA OR THROMBOEMBOLISM: NO
DIABETES: NO
SEX: MALE
CHF OR LEFT VENTRICULAR DYSFUNCTION: NO
AGE 65 TO 74: NO
HYPERTENSION: NO
CHA2DS2 VASC SCORE: 0
AGE 75 OR GREATER: NO
VASCULAR DISEASE: NO

## 2025-04-15 NOTE — PROGRESS NOTES
"NEW DOAC   .  Anticoagulation Summary  As of 4/15/2025      INR goal:  --   TTR:  --   INR used for dosing:  No new INR was available at the time of this encounter.   Warfarin maintenance plan:  No maintenance plan   Next INR check:  --   Target end date:  10/4/2025    Indications    Bilateral pulmonary embolism (HCC) [I26.99]                 Anticoagulation Episode Summary       INR check location:  --    Preferred lab:  --    Send INR reminders to:  --    Comments:  --          Anticoagulation Care Providers       Provider Role Specialty Phone number    Meseret Aguila M.D. Referring Internal Medicine [phone]    [facility] Responsible  [phone]          Anticoagulation Patient Findings  Patient Findings       Negatives:  Signs/symptoms of thrombosis, Signs/symptoms of bleeding, Laboratory test error suspected, Change in health, Change in alcohol use, Change in activity, Upcoming invasive procedure, Emergency department visit, Upcoming dental procedure, Missed doses, Extra doses, Change in medications, Change in diet/appetite, Hospital admission, Bruising, Other complaints            PCP: Meseret Aguila M.D.  Cardiologist: none  Dx: PE  CHADSVASC = n/a  HAS-BLED = 0  Target End Date = ~ 6 mo then re-evaluate (Oct 4, 2025)    Pt Hx:   Patient has a PMH of dyslipidemia, osteoarthritis, schizophrenia and presented to [facility] ED after he was having some SOB and upper back pain. He was evaluated there and found to have bilateral PE of moderate clot burden.   He was started on Eliquis and his PCP referred him to our clinic for anticoagulation management.   Full documentation from [facility] was requested by PCP but as of now we have limited information.     He reports no real \"provoking\" factor aside from smoking. PCP instructed him to quit smoking and did so, but is now vaping in lieu of cigarettes. Offered smoking cessation materials today, but the patient reported that he had tried them " in the past and he smoked more when doing NRT.         Labs:  Lab Results   Component Value Date/Time    WBC 5.7 01/03/2024 05:37 PM    RBC 4.82 01/03/2024 05:37 PM    HEMOGLOBIN 16.3 01/03/2024 05:37 PM    HEMATOCRIT 46.8 01/03/2024 05:37 PM    MCV 97.1 01/03/2024 05:37 PM    MCH 33.8 (H) 01/03/2024 05:37 PM    MCHC 34.8 01/03/2024 05:37 PM    MPV 10.6 01/03/2024 05:37 PM    NEUTSPOLYS 71.00 01/03/2024 05:37 PM    LYMPHOCYTES 11.40 (L) 01/03/2024 05:37 PM    MONOCYTES 15.40 (H) 01/03/2024 05:37 PM    EOSINOPHILS 1.40 01/03/2024 05:37 PM    BASOPHILS 0.40 01/03/2024 05:37 PM      Lab Results   Component Value Date/Time    SODIUM 135 01/03/2024 05:37 PM    POTASSIUM 3.6 01/03/2024 05:37 PM    CHLORIDE 104 01/03/2024 05:37 PM    CO2 20 01/03/2024 05:37 PM    GLUCOSE 96 01/03/2024 05:37 PM    BUN 7 (L) 01/03/2024 05:37 PM    CREATININE 0.89 01/03/2024 05:37 PM        Pt is new to apixaban (Eliquis) and new to [facility]. Discussed:   Indication for DOAC therapy.  Importance of monitoring and compliance.   Monitoring parameters, signs and symptoms of bleeding or clotting.  DOAC therapy, side effects, potential DDIs, OTC medications  Hormonal therapy: No  Pregnancy: No  DDI: No  Pt is not on antiplatelet/NSAID therapy  Lifestyle safety, ie smoking, ETOH, hobby safety, fall safety/prevention  Procedures for missed doses or suspected missed doses, surgeries/procedures, travel, dental work, any medication changes    Take Eliquis 5 mg twice daily    DOAC affordable: yes    Samples provided: no    Labs to be completed prior to next f/u - CBC, CMP    F/U: 6 week(s)    Asim Hillman  PharmD    Added [facility] to care team   Send to [facility] Pharmacotherapy Program Consent                                             Name    David Petty    MRN number: [id number]    the following are guidelines for participation in the [facility] Pharmacotherapy [facility].     I, ____David Petty_____, understand  and voluntarily agree to participate in the [facility] Pharmacotherapy Program and to have services provided to me by pharmacists working in collaboration with my provider.    I understand the pharmacist within the [facility] Pharmacotherapy Program may initiate, modify or discontinue my medications, order appropriate testing and appointments, perform exams, monitor treatment, and make clinical evaluations and decisions pursuant to a collaborative practice agreement with my provider.  I understand the pharmacist within the [facility] Pharmacotherapy Program is not a physician, osteopathic physician, advanced practice registered nurse or physician assistant and may not diagnose.  I will take all my medications as instructed and not change the way I take it without first talking to my provider or a pharmacist within the [facility] Pharmacotherapy Program.  I understand that if I am late to my appointment I may not be able to be seen by a pharmacist at that time and will have to reschedule my appointment.  During appointment with pharmacist I understand that pharmacist has the right not to answer questions or perform services outside the pharmacist’s scope of practice.  By signing below, I provide informed consent for the pharmacist to provide these services and for my participation in the [facility] Pharmacotherapy Program.      David Petty           [id number]          04/15/25  Patient Name                   MRN number  Date     ____Obtained verbal consent from David Petty

## 2025-04-16 NOTE — PROGRESS NOTES
Initial anticoagulation clinic note and most recent PCP note reviewed    Patient started OAC with Eliquis and no other event hospital for presentation with bilateral PE    Pending further recommendations, we will continue with 6 months oral anticoagulation after which time we can check back with PCP determine whether or not we should be continuing with extended therapy and/or considering further workup    Will defer any indicated age appropriate screening for occult malignancy to pcp.    Michael [facility], MD  Anticoagulation Clinic    Cc:  PJ Aguila

## 2025-04-24 DIAGNOSIS — I26.99 BILATERAL PULMONARY EMBOLISM (HCC): ICD-10-CM

## 2025-04-25 ENCOUNTER — OFFICE VISIT (OUTPATIENT)
Dept: MEDICAL GROUP | Facility: MEDICAL CENTER | Age: 60
End: 2025-04-25
Attending: INTERNAL MEDICINE
Payer: MEDICAID

## 2025-04-25 VITALS
RESPIRATION RATE: 16 BRPM | SYSTOLIC BLOOD PRESSURE: 110 MMHG | WEIGHT: 183.5 LBS | BODY MASS INDEX: 24.32 KG/M2 | HEART RATE: 75 BPM | OXYGEN SATURATION: 94 % | HEIGHT: 73 IN | TEMPERATURE: 97.4 F | DIASTOLIC BLOOD PRESSURE: 64 MMHG

## 2025-04-25 DIAGNOSIS — I26.99 BILATERAL PULMONARY EMBOLISM (HCC): ICD-10-CM

## 2025-04-25 DIAGNOSIS — G89.29 CHRONIC LEFT HIP PAIN: ICD-10-CM

## 2025-04-25 DIAGNOSIS — M25.552 CHRONIC LEFT HIP PAIN: ICD-10-CM

## 2025-04-25 DIAGNOSIS — R07.9 CHEST PAIN, UNSPECIFIED TYPE: ICD-10-CM

## 2025-04-25 PROCEDURE — 99213 OFFICE O/P EST LOW 20 MIN: CPT | Performed by: INTERNAL MEDICINE

## 2025-04-25 PROCEDURE — 99214 OFFICE O/P EST MOD 30 MIN: CPT | Performed by: INTERNAL MEDICINE

## 2025-04-25 PROCEDURE — 3078F DIAST BP <80 MM HG: CPT | Performed by: INTERNAL MEDICINE

## 2025-04-25 PROCEDURE — 3074F SYST BP LT 130 MM HG: CPT | Performed by: INTERNAL MEDICINE

## 2025-04-25 PROCEDURE — 700111 HCHG RX REV CODE 636 W/ 250 OVERRIDE (IP): Mod: JZ,UD

## 2025-04-25 RX ORDER — KETOROLAC TROMETHAMINE 15 MG/ML
15 INJECTION, SOLUTION INTRAMUSCULAR; INTRAVENOUS ONCE
Status: COMPLETED | OUTPATIENT
Start: 2025-04-25 | End: 2025-04-25

## 2025-04-25 RX ORDER — HYDROCODONE BITARTRATE AND ACETAMINOPHEN 5; 325 MG/1; MG/1
1 TABLET ORAL 2 TIMES DAILY PRN
Qty: 14 TABLET | Refills: 0 | Status: SHIPPED | OUTPATIENT
Start: 2025-04-25 | End: 2025-05-02

## 2025-04-25 RX ADMIN — KETOROLAC TROMETHAMINE 15 MG: 15 INJECTION, SOLUTION INTRAMUSCULAR; INTRAVENOUS at 17:03

## 2025-04-25 ASSESSMENT — FIBROSIS 4 INDEX: FIB4 SCORE: 1.62

## 2025-04-25 NOTE — ASSESSMENT & PLAN NOTE
He has made appointments to see Dr. Powers as well as to follow-up with the pharmacotherapy team for his anticoagulation.  We discussed duration of therapy for his treatment uncertain given unknown etiology, appreciate vascular medicine help in determining this.  He continues on Eliquis 10 mg twice daily

## 2025-04-29 NOTE — PROGRESS NOTES
Subjective:   David Petty is a 59 y.o. male here today for multiple concerns    Bilateral pulmonary embolism (HCC)   He has made appointments to see Dr. Powers as well as to follow-up with the pharmacotherapy team for his anticoagulation.  We discussed duration of therapy for his treatment uncertain given unknown etiology, appreciate vascular medicine help in determining this.  He continues on Eliquis 10 mg twice daily    Chronic left hip pain  Left hip has been really bothering him lately.  It is causing him to limp significantly.  He would like to get Toradol today.  We did discuss increase bleeding risk of any NSAID given he is on anticoagulation, he would like to proceed today.    Chest pain  The 10-year ASCVD risk score (Janette MALHOTRA, et al., 2019) is: 11.6%    Values used to calculate the score:      Age: 59 years      Sex: Male      Is Non- : No      Diabetic: No      Tobacco smoker: Yes      Systolic Blood Pressure: 110 mmHg      Is BP treated: No      HDL Cholesterol: 51 mg/dL      Total Cholesterol: 236 mg/dL  He is experiencing some chest pain which he relates to the blood clots.  He also reports some chest tightness.  He states that it is happening every other day and he describes it as severe.  He has made appointments to see Dr. Powers as well as to follow-up with the pharmacotherapy team for his anticoagulation.  We discussed duration of therapy for his treatment uncertain given unknown etiology, appreciate vascular medicine help in determining this.  His chest pain is sometimes associated with shortness of breath.  It is coming on with rest.  He has never had a cardiac stress test.  He is significantly limited in his ability to walk on a treadmill secondary to severe end-stage hip osteoarthritis therefore he will need a nuclear medicine stress test.         Current medicines (including changes today)  Current Outpatient Medications   Medication Sig Dispense Refill     HYDROcodone-acetaminophen (NORCO) 5-325 MG Tab per tablet Take 1 Tablet by mouth 2 times a day as needed (chest pain related to PE) for up to 7 days. 14 Tablet 0    ELIQUIS 5 MG Tab Take 10 mg by mouth 2 times a day.      acetaminophen (TYLENOL) 500 MG Tab Take 1-2 Tablets by mouth every 6 hours as needed for Moderate Pain. Not to exceed 4 tabs per day 120 Tablet 3    lidocaine (LMX) 4 % Cream Apply to thin layer to painful area of hip/low back up to TID  g 1    ergocalciferol (DRISDOL) 31481 UNIT capsule Take 1 Capsule by mouth every 7 days. 4 Capsule 5    atorvastatin (LIPITOR) 20 MG Tab Take 1 Tablet by mouth every day. 90 Tablet 3    ondansetron (ZOFRAN ODT) 4 MG TABLET DISPERSIBLE Take 1 Tablet by mouth every 6 hours as needed for Nausea/Vomiting. 10 Tablet 0    fluticasone (FLONASE) 50 MCG/ACT nasal spray Administer 1 Spray into affected nostril(S) every day. 32 g 3    fexofenadine (ALLEGRA) 180 MG tablet Take 1 Tablet by mouth every day. 30 Tablet 11    lurasidone (LATUDA) 40 MG Tab Take 40 mg by mouth with dinner.      cyclobenzaprine (FLEXERIL) 10 mg Tab Take 1 Tablet by mouth 3 times a day as needed for Muscle Spasms. 30 Tablet 5     No current facility-administered medications for this visit.     He  has a past medical history of Allergy, Anxiety, Arthralgia of left hip, Depression, Hyperlipidemia, and Schizophrenia (Formerly [facility]).         Objective:     Vitals:    04/25/25 1502   BP: 110/64   Pulse: 75   Resp: 16   Temp: 36.3 °C (97.4 °F)   SpO2: 94%     Body mass index is 24.21 kg/m².   Physical Exam:  Constitutional: Alert, no distress.  Skin: Warm, dry, good turgor, no rashes in visible areas.  Respiratory: Unlabored respiratory effort, lungs clear to auscultation, no wheezes, no ronchi.  Cardiovascular: Regular rate and rhythm, no murmurs appreciated, no lower extremity edema  Psych: Alert and oriented x3, normal affect and mood.      Assessment and Plan:   The following treatment plan was  discussed    1. Bilateral pulmonary embolism (HCC)  I have refilled a small supply of his hydrocodone which he was taking from the ER for his chest discomfort which could be related to his PE.  We discussed working this up as well with a nuclear medicine stress test as below.  He will follow-up with scheduled appointment with vascular medicine to discuss duration of anticoagulation, continue current Eliquis.  -Vascular medicine follow-up  -Continue Eliquis  - HYDROcodone-acetaminophen (NORCO) 5-325 MG Tab per tablet; Take 1 Tablet by mouth 2 times a day as needed (chest pain related to PE) for up to 7 days.  Dispense: 14 Tablet; Refill: 0    2. Chest pain, unspecified type  Need to rule out cardiac etiology with his risk factors.  As above, the only appropriate test for him would be a nuclear medicine stress test because he is not able to walk on a treadmill to achieve a sufficiently high heart rate related to his end-stage osteoarthritis of his left hip.  Therefore I have ordered a nuclear medicine stress test for him  - NM-CARDIAC STRESS TEST; Future    3. Chronic left hip pain  Discussed risks of continuing with Toradol, he is okay with this and wishes to proceed.  Ultimately needs hip replacement but patient has been reluctant, would hold off in the setting of his recent PEs anyway.  - ketorolac (Toradol) 15 MG/ML injection 15 mg  - ketorolac (Toradol) 15 MG/ML injection 15 mg        Followup: No follow-ups on file.

## 2025-04-29 NOTE — ASSESSMENT & PLAN NOTE
The 10-year ASCVD risk score (Janette MALHOTRA, et al., 2019) is: 11.6%    Values used to calculate the score:      Age: 59 years      Sex: Male      Is Non- : No      Diabetic: No      Tobacco smoker: Yes      Systolic Blood Pressure: 110 mmHg      Is BP treated: No      HDL Cholesterol: 51 mg/dL      Total Cholesterol: 236 mg/dL  He is experiencing some chest pain which he relates to the blood clots.  He also reports some chest tightness.  He states that it is happening every other day and he describes it as severe.  He has made appointments to see Dr. Powers as well as to follow-up with the pharmacotherapy team for his anticoagulation.  We discussed duration of therapy for his treatment uncertain given unknown etiology, appreciate vascular medicine help in determining this.  His chest pain is sometimes associated with shortness of breath.  It is coming on with rest.  He has never had a cardiac stress test.  He is significantly limited in his ability to walk on a treadmill secondary to severe end-stage hip osteoarthritis therefore he will need a nuclear medicine stress test.

## 2025-04-29 NOTE — ASSESSMENT & PLAN NOTE
Left hip has been really bothering him lately.  It is causing him to limp significantly.  He would like to get Toradol today.  We did discuss increase bleeding risk of any NSAID given he is on anticoagulation, he would like to proceed today.

## 2025-05-09 DIAGNOSIS — I26.99 BILATERAL PULMONARY EMBOLISM (HCC): ICD-10-CM

## 2025-05-09 RX ORDER — APIXABAN 5 MG/1
10 TABLET, FILM COATED ORAL 2 TIMES DAILY
Qty: 60 TABLET | Refills: 5 | Status: SHIPPED | OUTPATIENT
Start: 2025-05-09

## 2025-05-09 NOTE — TELEPHONE ENCOUNTER
Received request via: Pharmacy    Was the patient seen in the last year in this department? Yes    Does the patient have an active prescription (recently filled or refills available) for medication(s) requested? No    Pharmacy Name: [facility]    Does the patient have [facility] and need 100-day supply? (This applies to ALL medications) Patient does not have [facility]    Future Appointments         Provider Department Center    5/27/2025 1:00 PM [facility] EXAM 4 [facility]     6/10/2025 3:00 PM Jose Manuel Powers M.D. [facility]

## 2025-05-27 ENCOUNTER — ANTICOAGULATION VISIT (OUTPATIENT)
Dept: VASCULAR LAB | Facility: MEDICAL CENTER | Age: 60
End: 2025-05-27
Attending: INTERNAL MEDICINE
Payer: MEDICAID

## 2025-05-27 DIAGNOSIS — I26.99 BILATERAL PULMONARY EMBOLISM (HCC): Primary | ICD-10-CM

## 2025-05-27 PROCEDURE — 99212 OFFICE O/P EST SF 10 MIN: CPT

## 2025-05-27 NOTE — PROGRESS NOTES
Referral Date: 04/02/25    Target end date: 10/04/25  Indication: bilateral PE  Drug: Eliquis 5 mg bid  CHADsVASC = n/a  HAS-BLED = n/a    Health Status Since Last Assessment  Pt denies any new relevant medical problems, ED visits or hospitalizations  Pt denies any embolic events (stroke/tia/systemic embolism)    Pt states he's been having itching but this is tolerable. He denies any SOB    Adherence with DOAC Therapy  Pt has not missed doses in the average week.  DOAC is affordable    Bleeding Risk Assessment  Pt denies epistaxis  Pt denies any hematuria  Pt denies any excessive or unusual bleeding/hematomas.  Pt denies any GI bleeds or hematemesis.  Pt denies any concerning daily headache or subdural hematoma symptoms.    Latest Hemoglobin: WNL  Hemoglobin   Date Value Ref Range Status   01/03/2024 16.3 14.0 - 18.0 g/dL Final     Latest Platelets: Low but new labs pending  Platelet Count   Date Value Ref Range Status   01/03/2024 162 (L) 164 - 446 K/uL Final          Creatinine Clearance/Renal Function  Latest CrCl: >100 ml/min    Hepatic function  Latest LFTs: WNL  Pt denies any history of liver dysfunction   Pt denies  ETOH overuse     Drug Interactions  ASA/other antiplatelets: None  NSAID: None  Other drug interactions: None  Verified no anticonvulsant or azole therapy, education provided for future use.     Examination  Blood Pressure unable to perform at this visit  There were no vitals filed for this visit.  Symptomatic hypotension: Reports orthostatic hypotension  Significant gait impairment/imbalance/high risk for falls? No    Final Assessment and Recommendations:  Patient appears stable from the anticoagulation standpoint.    Benefits of continued DOAC therapy outweigh risks for this patient  Recommend pt continue with current DOAC therapy: Eliquis 5 mg bid     Other Actions: CMP/CBC hemogram ordered prior to next visit    Follow up:  With Vascular on 06/10/25     Pily Larson, PharmD

## 2025-06-06 ENCOUNTER — TELEPHONE (OUTPATIENT)
Dept: VASCULAR LAB | Facility: MEDICAL CENTER | Age: 60
End: 2025-06-06
Payer: MEDICAID

## 2025-06-06 NOTE — TELEPHONE ENCOUNTER
Called patient regarding NP appointment with Dr. Powers. Called to confirm if this will be first time patient is Vascular Med provider and review if any recent testing completed or hospital admissions. No answer, left voicemail to call back.    Correct appointment type? Yes  Referral attached to appointment? Yes  New patient packet sent via [employer]?  No [employer] is not available

## 2025-06-10 ENCOUNTER — APPOINTMENT (OUTPATIENT)
Dept: VASCULAR LAB | Facility: MEDICAL CENTER | Age: 60
End: 2025-06-10
Attending: FAMILY MEDICINE
Payer: MEDICAID

## 2025-06-10 ENCOUNTER — DOCUMENTATION (OUTPATIENT)
Dept: VASCULAR LAB | Facility: MEDICAL CENTER | Age: 60
End: 2025-06-10
Payer: MEDICAID

## 2025-06-10 NOTE — PROGRESS NOTES
David Petty has been referred for evaluation and management in the vascular care clinic.     Same day cancellation for new patient appointment.   We will be unable to take part in care until or unless patient makes an appointment for a face-to-face visit in our center  We will ask our  or medical assistant to call and reschedule WITH ANY NEXT AVAILABLE PROVIDER      Pending further patient contact, we will defer all vascular care, including management of cardiovascular risk factors, to PCP and other members of the care team    Vascular Medicine Clinic   [facility]   [phone]

## 2025-07-24 DIAGNOSIS — Z79.01 CHRONIC ANTICOAGULATION: ICD-10-CM

## 2025-08-01 ENCOUNTER — TELEPHONE (OUTPATIENT)
Dept: VASCULAR LAB | Facility: MEDICAL CENTER | Age: 60
End: 2025-08-01
Payer: MEDICAID

## 2025-08-06 DIAGNOSIS — I26.99 BILATERAL PULMONARY EMBOLISM (HCC): ICD-10-CM

## 2025-08-06 DIAGNOSIS — E55.9 VITAMIN D DEFICIENCY: ICD-10-CM

## 2025-08-06 RX ORDER — ERGOCALCIFEROL 1.25 MG/1
50000 CAPSULE ORAL
Qty: 4 CAPSULE | Refills: 5 | Status: SHIPPED | OUTPATIENT
Start: 2025-08-06

## 2025-08-06 RX ORDER — APIXABAN 5 MG/1
5 TABLET, FILM COATED ORAL 2 TIMES DAILY
Qty: 60 TABLET | Refills: 0 | Status: SHIPPED | OUTPATIENT
Start: 2025-08-06 | End: 2025-08-07 | Stop reason: SDUPTHER

## 2025-08-07 ENCOUNTER — OFFICE VISIT (OUTPATIENT)
Dept: VASCULAR LAB | Facility: MEDICAL CENTER | Age: 60
End: 2025-08-07
Attending: INTERNAL MEDICINE
Payer: MEDICAID

## 2025-08-07 ENCOUNTER — SPECIALTY PHARMACY (OUTPATIENT)
Dept: VASCULAR LAB | Facility: MEDICAL CENTER | Age: 60
End: 2025-08-07

## 2025-08-07 VITALS
HEART RATE: 62 BPM | HEIGHT: 73 IN | DIASTOLIC BLOOD PRESSURE: 58 MMHG | SYSTOLIC BLOOD PRESSURE: 92 MMHG | WEIGHT: 175.8 LBS | BODY MASS INDEX: 23.3 KG/M2

## 2025-08-07 DIAGNOSIS — E78.00 PRIMARY HYPERCHOLESTEROLEMIA: ICD-10-CM

## 2025-08-07 DIAGNOSIS — Z79.01 CHRONIC ANTICOAGULATION: ICD-10-CM

## 2025-08-07 DIAGNOSIS — E78.5 DYSLIPIDEMIA: ICD-10-CM

## 2025-08-07 DIAGNOSIS — F17.211 CIGARETTE NICOTINE DEPENDENCE IN REMISSION: ICD-10-CM

## 2025-08-07 DIAGNOSIS — R07.89 OTHER CHEST PAIN: ICD-10-CM

## 2025-08-07 DIAGNOSIS — F20.9 SCHIZOPHRENIA, UNSPECIFIED TYPE (HCC): ICD-10-CM

## 2025-08-07 DIAGNOSIS — F17.290 VAPING NICOTINE DEPENDENCE, TOBACCO PRODUCT: ICD-10-CM

## 2025-08-07 DIAGNOSIS — I82.432 ACUTE DEEP VEIN THROMBOSIS (DVT) OF POPLITEAL VEIN OF LEFT LOWER EXTREMITY (HCC): Chronic | ICD-10-CM

## 2025-08-07 DIAGNOSIS — K08.109 EDENTULOUS: ICD-10-CM

## 2025-08-07 DIAGNOSIS — I26.99 BILATERAL PULMONARY EMBOLISM (HCC): Primary | Chronic | ICD-10-CM

## 2025-08-07 PROBLEM — Z86.718 HISTORY OF DVT (DEEP VEIN THROMBOSIS): Chronic | Status: ACTIVE | Noted: 2025-08-07

## 2025-08-07 PROBLEM — Z86.711 HISTORY OF PULMONARY EMBOLUS (PE): Chronic | Status: ACTIVE | Noted: 2025-08-07

## 2025-08-07 PROCEDURE — 99204 OFFICE O/P NEW MOD 45 MIN: CPT | Performed by: FAMILY MEDICINE

## 2025-08-07 PROCEDURE — 3074F SYST BP LT 130 MM HG: CPT | Performed by: FAMILY MEDICINE

## 2025-08-07 PROCEDURE — 3078F DIAST BP <80 MM HG: CPT | Performed by: FAMILY MEDICINE

## 2025-08-07 PROCEDURE — 99215 OFFICE O/P EST HI 40 MIN: CPT | Performed by: FAMILY MEDICINE

## 2025-08-07 ASSESSMENT — ENCOUNTER SYMPTOMS
CLAUDICATION: 0
HEMOPTYSIS: 0
FEVER: 0
BRUISES/BLEEDS EASILY: 0
SHORTNESS OF BREATH: 0
PND: 0
BLOOD IN STOOL: 0
ORTHOPNEA: 0
CHILLS: 0
PALPITATIONS: 0
WHEEZING: 0
SPUTUM PRODUCTION: 0
COUGH: 0

## 2025-08-07 ASSESSMENT — FIBROSIS 4 INDEX: FIB4 SCORE: 1.64

## 2025-08-08 ENCOUNTER — OFFICE VISIT (OUTPATIENT)
Dept: MEDICAL GROUP | Facility: MEDICAL CENTER | Age: 60
End: 2025-08-08
Attending: INTERNAL MEDICINE
Payer: MEDICAID

## 2025-08-08 VITALS
WEIGHT: 176.9 LBS | HEIGHT: 72 IN | TEMPERATURE: 95.3 F | RESPIRATION RATE: 16 BRPM | BODY MASS INDEX: 23.96 KG/M2 | DIASTOLIC BLOOD PRESSURE: 52 MMHG | HEART RATE: 66 BPM | SYSTOLIC BLOOD PRESSURE: 74 MMHG | OXYGEN SATURATION: 95 %

## 2025-08-08 DIAGNOSIS — Z79.01 CHRONIC ANTICOAGULATION: ICD-10-CM

## 2025-08-08 DIAGNOSIS — R07.9 CHEST PAIN, UNSPECIFIED TYPE: ICD-10-CM

## 2025-08-08 DIAGNOSIS — I95.9 HYPOTENSION, UNSPECIFIED HYPOTENSION TYPE: Primary | ICD-10-CM

## 2025-08-08 PROCEDURE — 3078F DIAST BP <80 MM HG: CPT | Performed by: INTERNAL MEDICINE

## 2025-08-08 PROCEDURE — 99212 OFFICE O/P EST SF 10 MIN: CPT | Performed by: INTERNAL MEDICINE

## 2025-08-08 PROCEDURE — 99214 OFFICE O/P EST MOD 30 MIN: CPT | Performed by: INTERNAL MEDICINE

## 2025-08-08 PROCEDURE — 3074F SYST BP LT 130 MM HG: CPT | Performed by: INTERNAL MEDICINE

## 2025-08-08 ASSESSMENT — FIBROSIS 4 INDEX: FIB4 SCORE: 1.64

## 2025-08-11 PROBLEM — I82.539 CHRONIC DEEP VEIN THROMBOSIS (DVT) OF POPLITEAL VEIN (HCC): Status: ACTIVE | Noted: 2025-08-07

## 2025-08-11 PROBLEM — Z09 HOSPITAL DISCHARGE FOLLOW-UP: Status: RESOLVED | Noted: 2025-04-02 | Resolved: 2025-08-11
